# Patient Record
Sex: MALE | Race: WHITE | NOT HISPANIC OR LATINO | Employment: FULL TIME | URBAN - METROPOLITAN AREA
[De-identification: names, ages, dates, MRNs, and addresses within clinical notes are randomized per-mention and may not be internally consistent; named-entity substitution may affect disease eponyms.]

---
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
## 2020-01-16 ENCOUNTER — CLINICAL SUPPORT (OUTPATIENT)
Dept: PEDIATRIC CARDIOLOGY | Facility: CLINIC | Age: 20
End: 2020-01-16
Attending: NURSE PRACTITIONER
Payer: COMMERCIAL

## 2020-01-16 ENCOUNTER — OFFICE VISIT (OUTPATIENT)
Dept: PEDIATRIC CARDIOLOGY | Facility: CLINIC | Age: 20
End: 2020-01-16
Payer: COMMERCIAL

## 2020-01-16 VITALS
SYSTOLIC BLOOD PRESSURE: 125 MMHG | DIASTOLIC BLOOD PRESSURE: 80 MMHG | HEIGHT: 67 IN | BODY MASS INDEX: 21.2 KG/M2 | RESPIRATION RATE: 16 BRPM | OXYGEN SATURATION: 99 % | HEART RATE: 52 BPM | WEIGHT: 135.06 LBS

## 2020-01-16 DIAGNOSIS — Q23.0 AORTIC STENOSIS DUE TO BICUSPID AORTIC VALVE: ICD-10-CM

## 2020-01-16 DIAGNOSIS — R00.2 PALPITATION: ICD-10-CM

## 2020-01-16 DIAGNOSIS — R07.9 CHEST PAIN, UNSPECIFIED TYPE: ICD-10-CM

## 2020-01-16 DIAGNOSIS — Q23.1 AORTIC STENOSIS DUE TO BICUSPID AORTIC VALVE: ICD-10-CM

## 2020-01-16 DIAGNOSIS — Q23.1 TRICUSPID BUT FUNCTIONALLY BICUSPID AORTIC VALVE: ICD-10-CM

## 2020-01-16 DIAGNOSIS — R00.1 BRADYCARDIA: ICD-10-CM

## 2020-01-16 PROCEDURE — 3008F PR BODY MASS INDEX (BMI) DOCUMENTED: ICD-10-PCS | Mod: CPTII,S$GLB,, | Performed by: NURSE PRACTITIONER

## 2020-01-16 PROCEDURE — 93000 ELECTROCARDIOGRAM COMPLETE: CPT | Mod: 59,S$GLB,, | Performed by: PEDIATRICS

## 2020-01-16 PROCEDURE — 3008F BODY MASS INDEX DOCD: CPT | Mod: CPTII,S$GLB,, | Performed by: NURSE PRACTITIONER

## 2020-01-16 PROCEDURE — 93000 PR ELECTROCARDIOGRAM, COMPLETE: ICD-10-PCS | Mod: 59,S$GLB,, | Performed by: PEDIATRICS

## 2020-01-16 PROCEDURE — 99214 PR OFFICE/OUTPT VISIT, EST, LEVL IV, 30-39 MIN: ICD-10-PCS | Mod: 25,S$GLB,, | Performed by: NURSE PRACTITIONER

## 2020-01-16 PROCEDURE — 99214 OFFICE O/P EST MOD 30 MIN: CPT | Mod: 25,S$GLB,, | Performed by: NURSE PRACTITIONER

## 2020-01-16 NOTE — ASSESSMENT & PLAN NOTE
HR today is 52bpm, reportedly has been 44-47bpm at home. 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 He denies dizziness, fatigue, lethargy, and syncope. We will evaluate heart rate ranges on holter.

## 2020-01-16 NOTE — ASSESSMENT & PLAN NOTE
We have discussed his aortic valve anatomy, which is functionally bicuspid. In this situation, we monitor specifically for aortic stenosis (narrowing), aortic insufficiency (leaking), and aortic root ectasia (dilation). Statistically, these changes will occur over time. He should be seen at least yearly and have serial echocardiograms to monitor for changes. For now, he can be handled normally but may require activity restrictions in the future pending the valve changes.     Based on 2018 echo and today's exam, he currently has mild stenosis, trivial insufficiency, and mildly dilated ascending aorta (z-score +2.2).

## 2020-01-16 NOTE — PROGRESS NOTES
[facility]  Tomer Melissa  2000    Tomer Melissa is a 19 y.o. male presenting for follow-up of functional bicuspid aortic valve.  Tomer is here today with his mother.    HPI  Toemr was initially sent for cardiac evaluation in 2013 for blacking out and chest pain; he was subsequently diagnosed with functionally bicuspid aortic valve, minimal stenosis, mild insufficiency, and LV enlargement. He failed follow-up until March 2018 when he returned for chest pain, which we felt was likely non-cardiac. His exam revealed soft MARY noted over LVOT. He was scheduled for CXR and echo, then asked to return in 1 year.     Tomer comes today for late follow-up with report of chest pain x 1, palpitations x 3, and low heart rate over the last month or two. Since our last visit, he has not been very physically active particularly now that he's in college. He denies excessive caffeine, illness, injury, or hospitalization. For the last month, he has had 3 episodes of palpitations while laying in bed at night. He reports that his heart beat is very slow but it beats hard; HR 44-47 using blood pressure cuff. He denies being anxious. On one occasion, he had a soreness in the lower left chest and was seen in the ER at [facility]. The ER note has been reviewed and scanned to the chart. Cardiac enzymes were negative but BNP was elevated (260, range 5-125), Magnesium was low (1.6, range 1.8-2.4), and total bilirubin was elevated (1.3, range 0.2-1.0). He had chest and abdominal x-rays which were normal and EKG which is similar to today's EKG with bradycardia, wandering atrial pacemaker, and early repolarization.       No current outpatient medications on file.  Allergies: Review of patient's allergies indicates:  No Known Allergies    Family History   Problem Relation Age of Onset    No Known Problems Mother     Thyroid disease Father     Other Father         lung collapse in 1999    Fainting Father          "syncope loop recorder placed Dec 17    No Known Problems Sister     No Known Problems Brother     COPD Maternal Grandmother     Cardiomyopathy Maternal Grandmother     Hypertension Maternal Grandfather     Hypertension Paternal Grandfather     Arrhythmia Neg Hx     Congenital heart disease Neg Hx     Pacemaker/defibrilator Neg Hx     Long QT syndrome Neg Hx     Heart attacks under age 50 Neg Hx      Past Medical History:   Diagnosis Date    Aortic insufficiency     Aortic stenosis     Chest pain     Tricuspid but functionally bicuspid aortic valve      Past Surgical History:   Procedure Laterality Date    NO PAST SURGERIES       Birth History    Birth     Weight: 3.402 kg (7 lb 8 oz)    Gestation Age: 40 wks     Social History     Social History Narrative    Currently attending college in [place], no regular exercise. Sleeps 5-8 hours each night; drinks Mountain Dew every other day or so, otherwise mostly drinks water. Appetite is good.        Review of Systems   Constitutional: Negative for activity change, appetite change and fatigue.   Respiratory: Negative for shortness of breath, wheezing and stridor.    Cardiovascular: Positive for chest pain and palpitations.   Gastrointestinal: Negative.    Genitourinary: Negative.    Musculoskeletal: Negative.    Skin: Negative for color change and rash.   Neurological: Negative for dizziness, seizures, syncope, weakness and headaches.   Hematological: Does not bruise/bleed easily.   Psychiatric/Behavioral: The patient is not nervous/anxious.        Objective:   Vitals:    01/16/20 0843   BP: 125/80   BP Location: Right arm   Patient Position: Sitting   BP Method: Large (Manual)   Pulse: (!) 52   Resp: 16   SpO2: 99%   Weight: 61.3 kg (135 lb 0.5 oz)   Height: 5' 6.54" (1.69 m)       Physical Exam   Constitutional: He is oriented to person, place, and time. Vital signs are normal. He appears well-developed and well-nourished. He is active and cooperative. " No distress.   HENT:   Head: Normocephalic.   Neck: Normal range of motion.   Cardiovascular: Normal rate, regular rhythm, S1 normal and S2 normal.  No extrasystoles are present. Exam reveals no S3 and no S4.   Murmur (grade 1/6 MARY noted along LVOT to URSB) heard.  Pulses:       Radial pulses are 2+ on the right side.        Femoral pulses are 2+ on the right side.  There are no clicks, rumbles, rubs, lifts, taps, or thrills noted.   Pulmonary/Chest: Effort normal and breath sounds normal. No respiratory distress. He exhibits no deformity.   Abdominal: Soft. Normal appearance and bowel sounds are normal. He exhibits no distension. There is no hepatosplenomegaly.   There are no abdominal bruits noted.   Musculoskeletal: Normal range of motion.   Neurological: He is alert and oriented to person, place, and time.   Skin: Skin is warm and dry. No rash noted. No cyanosis. Nails show no clubbing.   Psychiatric: He has a normal mood and affect. His speech is normal and behavior is normal.   Nursing note and vitals reviewed.      Tests:   Today's EKG interpretation by Dr. Osorio reveals: normal sinus rhythm and wandering atrial pacemaker with QRS axis +68 degrees in the frontal plane. There is no atrial enlargement or ventricular hypertrophy noted. Early repolarization.  (Final report in electronic medical record)    Echocardiogram:   Pertinent Echocardiographic findings from the Echo dated 5/16/18 are:   Bicommissural Aortic valve with fusion of RCC and NCC  AV PG 19 (10) mmHg  AV Peak velocity 2.15 m/s  Trivial AI  Dilatation of the Asc. Ao, 3.1cm, z-score +2.2  Descending aorta PG 25 (7) mmHg  (Full report in electronic medical record)    Cm(z-score) 3/12/13 [facility] echo 5/16/18 [facility] echo   LVID 4.8 (z+1.1) 4.8 (z-0.23)   Ao annulus  2.3 (z+1.3)   Ao root 2.1 (z-0.99) 2.8 (z+0.14)   ST junction  3.0 (z+2.5)   Ascending Aorta 2.0 (z+1.1) 3.1 (z+2.2)   AV gradient 12(7) 19(10)   AV insufficiency Mild Trivial         Assessment:  1. Tricuspid but functionally bicuspid aortic valve    2. Aortic stenosis due to bicuspid aortic valve    3. Chest pain, unspecified type    4. Palpitation    5. Bradycardia        Discussion:   Dr. Osorio reviewed history and physical exam. He then performed the physical exam. He discussed the findings with the patient's caregiver(s), and answered all questions.    Tricuspid but functionally bicuspid aortic valve  We have discussed his aortic valve anatomy, which is functionally bicuspid. In this situation, we monitor specifically for aortic stenosis (narrowing), aortic insufficiency (leaking), and aortic root ectasia (dilation). Statistically, these changes will occur over time. He should be seen at least yearly and have serial echocardiograms to monitor for changes. For now, he can be handled normally but may require activity restrictions in the future pending the valve changes.     Based on 2018 echo and today's exam, he currently has mild stenosis, trivial insufficiency, and mildly dilated ascending aorta (z-score +2.2).    Aortic valve stenosis  Aortic stenosis, using mean gradient to categorize, is considered mild with gradient < 25mmHg, moderate with gradient 25-40mmHg. Activity restrictions and/or intervention may be needed for moderate to severe stenosis. There is risk of sudden death with severe aortic stenosis. 2018 echo gradient was mean 10mmHg and mild; physical exam today is consistent with those findings but repeat echo will be done today.    Chest pain  Tomer has a clinical exam and history consistent with noncardiac chest pain. Noncardiac chest pain can be costochondritis, pleurisy, chest wall pain, asthma, reflux, etc. We have discussed the difference between the pain reported today and typical pathologic pain that could occur with moderate to severe aortic stenosis. Cardiac enzymes done at recent ER visit for chest pain were normal.     Palpitation  Tomer reports his heart  beating slow and hard on three occasions recently. He has wandering atrial pacemaker on EKG. We will obtain 5 day holter today to evaluate for dysrhythmias and to determine heart rate range.     Bradycardia  HR today is 52bpm, reportedly has been 44-47bpm at home. 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 He denies dizziness, fatigue, lethargy, and syncope. We will evaluate heart rate ranges on holter.     I have reviewed our general guidelines related to cardiac issues with the family.  I instructed them in the event of an emergency to call 911 or go to the nearest emergency room.  They know to contact the PCP if problems arise or if they are in doubt.      Plan:    1. Activity:No activity restrictions are indicated at this time. Activities may include endurance training, interscholastic athletic, competition and contact sports. However, I would recommend avoiding heavy weight lifting; anything that can be moved 10-20 times without straining would be appropriate.    2. Selective endocarditis prophylaxis is recommended in this circumstance.     3. Medications:   No current outpatient medications on file.     No current facility-administered medications for this visit.      4. Orders placed this encounter  Orders Placed This Encounter   Procedures    Holter Monitor - 3-14 Day Pediatrics    EKG 12-lead pediatric    Echocardiogram pediatric     5. Follow up with the primary care provider for the following issues: total bilirubin was elevated and magnesium was low at recent ER visit.       Follow-Up:   Follow up for echo and 5 day holter today; clinic f/u, EKG, and echo in 1 year.      Sincerely,    Johnny Osorio MD    Note Contributing Authors:  MD Maria Luz Olea APRN, PNP-C

## 2020-01-16 NOTE — ASSESSMENT & PLAN NOTE
Tomer has a clinical exam and history consistent with noncardiac chest pain. Noncardiac chest pain can be costochondritis, pleurisy, chest wall pain, asthma, reflux, etc. We have discussed the difference between the pain reported today and typical pathologic pain that could occur with moderate to severe aortic stenosis. Cardiac enzymes done at recent ER visit for chest pain were normal.

## 2020-01-16 NOTE — ASSESSMENT & PLAN NOTE
Aortic stenosis, using mean gradient to categorize, is considered mild with gradient < 25mmHg, moderate with gradient 25-40mmHg. Activity restrictions and/or intervention may be needed for moderate to severe stenosis. There is risk of sudden death with severe aortic stenosis. 2018 echo gradient was mean 10mmHg and mild; physical exam today is consistent with those findings but repeat echo will be done today.

## 2020-01-16 NOTE — PATIENT INSTRUCTIONS
Johnny Osorio MD  Pediatric Cardiology  [address] [place] [address]  [phone]    General Guidelines    Name: Tomer Melissa                   : 2000    Diagnosis:   1. Tricuspid but functionally bicuspid aortic valve    2. Aortic stenosis due to bicuspid aortic valve    3. Chest pain, unspecified type    4. Palpitation    5. Bradycardia        PCP: Antwon Rodrigez MD  PCP Phone Number: [phone]    · If you have an emergency or you think you have an emergency, go to the nearest emergency room!     · Breathing too fast, doesnt look right, consistently not eating well, your child needs to be checked. These are general indications that your child is not feeling well. This may be caused by anything, a stomach virus, an ear ache or heart disease, so please call Antwon Rodrigez MD. If Antwon Rodrigez MD thinks you need to be checked for your heart, they will let us know.     · If your child experiences a rapid or very slow heart rate and has the following symptoms, call Antwon Rodrigez MD or go to the nearest emergency room.   · unexplained chest pain   · does not look right   · feels like they are going to pass out   · actually passes out for unexplained reasons   · weakness or fatigue   · shortness of breath  or breathing fast   · consistent poor feeding     · If your child experiences a rapid or very slow heart rate that lasts longer than 30 minutes call Antwon Rodrigez MD or go to the nearest emergency room.     · If your child feels like they are going to pass out - have them sit down or lay down immediately. Raise the feet above the head (prop the feet on a chair or the wall) until the feeling passes. Slowly allow the child to sit, then stand. If the feeling returns, lay back down and start over.     It is very important that you notify Antwon Rodrigez MD first. Antwon Rodrigez MD or the ER Physician can reach Dr. Johnny Osorio at the office or through [facility]  PICU at [phone] as needed.    Call our office ([phone]) one week after ALL tests for results.         PREVENTION OF BACTERIAL ENDOCARDITIS (selective IE)    A COPY OF THIS SHEET MUST BE GIVEN TO ALL OF YOUR DOCTORS OR HEALTH CARE PROVIDERS    You have received this information because you are at an increased risk for developing adverse outcomes from infective endocarditis (IE), also known as subacute bacterial endocarditis (SBE).    Patient Name:  Tomer Melissa    : 2000   Diagnosis:   1. Tricuspid but functionally bicuspid aortic valve    2. Aortic stenosis due to bicuspid aortic valve    3. Chest pain, unspecified type    4. Palpitation    5. Bradycardia        As of 2020, Johnny Osorio MD, Pediatric Cardiologist recommends that Tomer receive SELECTIVE USE of antibiotic prophylaxis from bacterial endocarditis.    Antibiotic prophylaxis with dental or surgical procedures is recommended in selected instances if your dentist, surgeon or physician believes there is a greater risk of infection.  For example:  1) Any significantly infected operative field (Example: dental abscess or ruptured appendix) which may increase the bacterial load to the blood stream during the procedure; 2) Benefits of antibiotic coverage should be weighed against risk of allergic reactions and anaphylaxis; therefore, their use should be carefully selected based on individual cases.     Antibiotic prophylaxis is NOT recommended for the following dental procedures or events: routine anesthetic injections through non-infected tissue; taking dental radiographs; placement of removable prosthodontic or orthodontic appliances; adjustment of orthodontic appliances; placement of orthodontic brackets; and shedding of deciduous teeth or bleeding from trauma to the lips or oral mucosa.   If recommended by the Health Care Provider - Antibiotic Prophylactic Regimens   Regimen - Single Dose 30-60 minutes before Procedure  Situation  Agent Adults Children   Oral Amoxicillin 2g 50/mg/kg   Unable to take oral meds Ampicillin   OR  Cefazolin or ceftriaxone 2 g IM or IV1    1 g IM or IV 50 mg/kg IM or IV    50 mg/kg IM or IV   Allergic to Penicillins or ampicillin-Oral regimen Cephalexin 2  OR  Clindamycin  OR  Azithromycin or clarithromycin 2 g    600 mg    500 mg 50 mg/kg    20 mg/kg    15 mg/kg   Allergic to penicillin or ampicillin and unable to take oral medications Cefazolin or ceftriaxone 3  OR  Clindamycin 1 g IM or IV    600 mg IM or IV 50 mg/kg IM or IV    20 mg/kg IM or IV   1IM - intramuscular; IV - intravenous  2Or other first or second generation oral cephalosporin in equivalent adult or pediatric dosage.  3Cephalosporins should not be used in an individual with a history of anaphylaxis, angioedema or urticaria with penicillin or ampicillin.   Adapted from Prevention of Infective Endocarditis: Guidelines From the American Heart Association, by the Committee on Rheumatic Fever, Endocarditis, and Kawasaki Disease. Circulation, e-published April 19, 2007. Go to www.americanheart.org/presenter for more information.    The practice of giving patients antibiotics prior to a dental procedure is no longer recommended EXCEPT for patients with the highest risk of adverse outcomes resulting from bacterial endocarditis. We cannot exclude the possibility that an exceedingly small number of cases, if any, of bacterial endocarditis may be prevented by antibiotic prophylaxis prior to a dental procedure. The importance of good oral and dental health and regular visits to the dentist is important for patients at risk for bacterial endocarditis.  Gastrointestinal (GI)/Genitourinary () Procedures: Antibiotic prophylaxis solely to prevent bacterial endocarditis is no longer recommended for patients who undergo a GI or  tract procedures, including patients with the highest risk of adverse outcomes due to bacterial endocarditis.    Good dental health  and hygiene is very effective in preventing bacterial endocarditis.   Always practice good dental health!

## 2020-01-16 NOTE — ASSESSMENT & PLAN NOTE
Tomer reports his heart beating slow and hard on three occasions recently. He has wandering atrial pacemaker on EKG. We will obtain 5 day holter today to evaluate for dysrhythmias and to determine heart rate range.

## 2020-01-16 NOTE — LETTER
January 16, 2020      Antwon Rodrigez MD  [address] [place], [place] [address] [place] [address]           [facility]  [address] [facility] [address]  Phone: [phone]  Fax: [phone]          Patient: Tomer Melissa   MR Number: [id number]   YOB: 2000   Date of Visit: 1/16/2020       Dear Dr. Antwon Rodrigez:    Thank you for referring Tomer Melissa to me for evaluation. Attached you will find relevant portions of my assessment and plan of care.    If you have questions, please do not hesitate to call me. I look forward to following Tomer Melissa along with you.    Sincerely,    CARLIE Stover,PNP-C    Enclosure  CC:  No Recipients    If you would like to receive this communication electronically, please contact [email or web address] or [phone] to request more information on [employer] Link access.    For providers and/or their staff who would like to refer a patient to [facility], please contact us through our one-stop-shop provider referral line, [facility], at [phone].    If you feel you have received this communication in error or would no longer like to receive these types of communications, please e-mail [email or web address]

## 2020-01-17 ENCOUNTER — TELEPHONE (OUTPATIENT)
Dept: PEDIATRIC CARDIOLOGY | Facility: CLINIC | Age: 20
End: 2020-01-17

## 2020-01-21 ENCOUNTER — DOCUMENTATION ONLY (OUTPATIENT)
Dept: PEDIATRIC CARDIOLOGY | Facility: CLINIC | Age: 20
End: 2020-01-21

## 2020-01-21 NOTE — PROGRESS NOTES
Cm(z-score) 3/12/13 [facility] echo 5/16/18 [facility] echo 1/16/2020 [facility] echo   LVID 4.8 (z+1.1) 4.8 (z-0.23) 5.1 (z+0.61)   Ao annulus   2.3 (z+1.3) 2.5 (z+2.6)   Ao root 2.1 (z-0.99) 2.8 (z+0.14) 3.0 (z+0.90)   ST junction   3.0 (z+2.5) 3.0 (z+2.7)   Ascending Aorta 2.0 (z+1.1) 3.1 (z+2.2) 3.5 (z+3.5)   AV gradient 12(7) 19(10) 15 CW   AV insufficiency Mild Trivial  Trivial + x 2 jets     Main change of concern is the ascending aorta, which is up 4mm and z-score now +3.5. Will review with TDK and request verification of measurements.     Seen 1/16/2020; holter placed for bradycardia, asymptomatic. Plan to return in 1 year. Now in college, no sports; advised no heavy weights.     TDK reviewed and agrees with ascending aorta measurement of 3.5cm. F/u 1 year.

## 2020-01-23 ENCOUNTER — TELEPHONE (OUTPATIENT)
Dept: PEDIATRIC CARDIOLOGY | Facility: CLINIC | Age: 20
End: 2020-01-23

## 2020-01-23 NOTE — TELEPHONE ENCOUNTER
Mom phoned. Reviewed echo results:   the heart function and chamber sizes were normal. His aortic measurements have changed slightly and one specifically has grown by 4mm so I'm going to have Dr. Osorio remeasure that one to be sure its accurate. His narrowing is still mild and the leaking is still trivial. We do expect changes over time; there is nothing that requires intervention for now; and it is still very important to protect the valve as long as we can - no heavy weight lifting.   We'll plan repeat echo in 1 year unless Dr. Osorio's review changes that; then we'll call them with any changes.     Mom reports that for the past 2 nights Tomer has woken up and advised that his heart feels weird. Denies pain and can not explain what he feels. Mom advised d/t what he feels he is unable to sleep. Advised mom I would review with MLP.    Phoned Tomer's phone to get more information and to review results. No answer. Unable to leave a message.    ----- Message from CARLIE Stover,PNP-C sent at 1/23/2020  9:26 AM CST -----  Please review with patient that the heart function and chamber sizes were normal. His aortic measurements have changed slightly and one specifically has grown by 4mm so I'm going to have Dr. Osorio remeasure that one to be sure its accurate. His narrowing is still mild and the leaking is still trivial. We do expect changes over time; there is nothing that requires intervention for now; and it is still very important to protect the valve as long as we can - no heavy weight lifting.     We'll plan repeat echo in 1 year unless Dr. Osorio's review changes that; then we'll call them with any changes.     mary    ----- Message -----  From: Demetra Osorio RN  Sent: 1/23/2020   9:10 AM CST  To: CARLIE Stover,PNP-C        ----- Message -----  From: Francheska Hathaway  Sent: 1/23/2020   8:59 AM CST  To: [facility] Staff    Pt is requesting a call back to get test results from Echo test.  Please call and  advise            Thank you

## 2020-01-23 NOTE — TELEPHONE ENCOUNTER
Phoned mom back reviewed Maria Luz's note with mom: Echo findings do not support anything like that, but holter was ordered at last visit and is pending. Did he have those symptoms during holter? We'll reassess after the holter is received and reviewed.   Mom denies symptoms during holter. Holter was taken off on Tuesday. Asked mom if she has mailed holter. Mom advised she would mail in the morning. Instructed mom to take Tomer to PCP/ER with the following per AVS:   unexplained chest pain   does not look right   feels like they are going to pass out   actually passes out for unexplained reasons   weakness or fatigue   shortness of breath or breathing fast   consistent poor feeding   If your child experiences a rapid or very slow heart rate that lasts longer than 30 minutes call Antwon Rodrigez MD or go to the nearest emergency room.    Mom verbalizes understanding.

## 2020-01-29 LAB
OHS CV EVENT MONITOR DAY: 5
OHS CV HOLTER LENGTH DECIMAL HOURS: 120
OHS CV HOLTER LENGTH HOURS: 0
OHS CV HOLTER LENGTH MINUTES: 0

## 2020-03-02 ENCOUNTER — TELEPHONE (OUTPATIENT)
Dept: PEDIATRIC CARDIOLOGY | Facility: CLINIC | Age: 20
End: 2020-03-02

## 2020-03-02 NOTE — TELEPHONE ENCOUNTER
----- Message from Opal Guerra MA sent at 3/2/2020  3:06 PM CST -----  Regarding: erik Tj Mora  Contact: [phone]  Call with holter results

## 2020-03-02 NOTE — TELEPHONE ENCOUNTER
Called mom back with results:     Result Notes for Holter Monitor - 3-14 Day Pediatrics:   Notes recorded by CARLIE Stover PNP-C on 2/25/2020 at 3:21 PM CST  REv'd with TDK. Ok as long as he is not symptomatic.  ------    Notes recorded by CARLIE Stover PNP-C on 1/30/2020 at 1:59 PM CST  Asymptomatic bradycardia noted in clinic. Hx Bicuspid AV. HR in clinic was 52bpm, reportedly has been 44-47bpm at home. 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 He denies dizziness, fatigue, lethargy, and syncope.     Interpretation Summary:   Sinus rhythm  Rare atrial ectopy  Rare PVCs  No diary symptoms     Diary:   The diary was returned incomplete. The tape was adequate (5 days , 0 hours, 0 minutes).     Rhythm:   Predominant Rhythm  Sinus rhythm with heart rates varying between 39 and 129 bpm with an average of 71 bpm.     Maximum heart rate recorded at: 14:12 on day 2.     Minimum heart rate recorded at 02:00 on day 6.     PVC:   Ventricular Arrhythmias  There were very rare PVCs totalling 4 and averaging 0.03 per hour.     PAC:   Supraventricular Arrhythmias  There were occasional PACs totalling 1220 and averaging 10.17 per hour.     115 atrial couplets.     Reminded of f/u in Jan 2021. All questions answered.

## 2021-01-26 DIAGNOSIS — Q23.1 BICUSPID AORTIC VALVE: Primary | ICD-10-CM

## 2021-01-26 DIAGNOSIS — Q23.1 TRICUSPID BUT FUNCTIONALLY BICUSPID AORTIC VALVE: ICD-10-CM

## 2021-01-26 DIAGNOSIS — I35.0 AORTIC VALVE STENOSIS, ETIOLOGY OF CARDIAC VALVE DISEASE UNSPECIFIED: ICD-10-CM

## 2021-01-26 DIAGNOSIS — I35.1 AORTIC VALVE INSUFFICIENCY, ETIOLOGY OF CARDIAC VALVE DISEASE UNSPECIFIED: ICD-10-CM

## 2021-01-29 DIAGNOSIS — R00.2 PALPITATIONS: ICD-10-CM

## 2021-01-29 DIAGNOSIS — R00.1 BRADYCARDIA: Primary | ICD-10-CM

## 2021-03-25 ENCOUNTER — OFFICE VISIT (OUTPATIENT)
Dept: PEDIATRIC CARDIOLOGY | Facility: CLINIC | Age: 21
End: 2021-03-25
Payer: COMMERCIAL

## 2021-03-25 ENCOUNTER — CLINICAL SUPPORT (OUTPATIENT)
Dept: PEDIATRIC CARDIOLOGY | Facility: CLINIC | Age: 21
End: 2021-03-25
Attending: PEDIATRICS
Payer: COMMERCIAL

## 2021-03-25 VITALS
OXYGEN SATURATION: 97 % | SYSTOLIC BLOOD PRESSURE: 120 MMHG | RESPIRATION RATE: 16 BRPM | HEIGHT: 67 IN | DIASTOLIC BLOOD PRESSURE: 80 MMHG | WEIGHT: 145.75 LBS | BODY MASS INDEX: 22.88 KG/M2 | HEART RATE: 58 BPM

## 2021-03-25 DIAGNOSIS — I35.0 AORTIC VALVE STENOSIS, ETIOLOGY OF CARDIAC VALVE DISEASE UNSPECIFIED: ICD-10-CM

## 2021-03-25 DIAGNOSIS — Q23.1 TRICUSPID BUT FUNCTIONALLY BICUSPID AORTIC VALVE: ICD-10-CM

## 2021-03-25 DIAGNOSIS — I35.1 AORTIC VALVE INSUFFICIENCY, ETIOLOGY OF CARDIAC VALVE DISEASE UNSPECIFIED: ICD-10-CM

## 2021-03-25 DIAGNOSIS — R00.1 BRADYCARDIA: ICD-10-CM

## 2021-03-25 DIAGNOSIS — Q23.1 TRICUSPID BUT FUNCTIONALLY BICUSPID AORTIC VALVE: Primary | ICD-10-CM

## 2021-03-25 PROCEDURE — 93000 EKG 12-LEAD: ICD-10-PCS | Mod: S$GLB,,, | Performed by: PEDIATRICS

## 2021-03-25 PROCEDURE — 99214 OFFICE O/P EST MOD 30 MIN: CPT | Mod: 25,S$GLB,, | Performed by: NURSE PRACTITIONER

## 2021-03-25 PROCEDURE — 99214 PR OFFICE/OUTPT VISIT, EST, LEVL IV, 30-39 MIN: ICD-10-PCS | Mod: 25,S$GLB,, | Performed by: NURSE PRACTITIONER

## 2021-03-25 PROCEDURE — 93000 ELECTROCARDIOGRAM COMPLETE: CPT | Mod: S$GLB,,, | Performed by: PEDIATRICS

## 2021-03-25 PROCEDURE — 3008F PR BODY MASS INDEX (BMI) DOCUMENTED: ICD-10-PCS | Mod: CPTII,S$GLB,, | Performed by: NURSE PRACTITIONER

## 2021-03-25 PROCEDURE — 3008F BODY MASS INDEX DOCD: CPT | Mod: CPTII,S$GLB,, | Performed by: NURSE PRACTITIONER

## 2021-03-29 ENCOUNTER — TELEPHONE (OUTPATIENT)
Dept: PEDIATRIC CARDIOLOGY | Facility: CLINIC | Age: 21
End: 2021-03-29

## 2022-01-31 ENCOUNTER — CLINICAL SUPPORT (OUTPATIENT)
Dept: PEDIATRIC CARDIOLOGY | Facility: CLINIC | Age: 22
End: 2022-01-31
Attending: NURSE PRACTITIONER
Payer: COMMERCIAL

## 2022-01-31 VITALS
HEIGHT: 67 IN | WEIGHT: 156.75 LBS | OXYGEN SATURATION: 97 % | RESPIRATION RATE: 18 BRPM | BODY MASS INDEX: 24.6 KG/M2 | SYSTOLIC BLOOD PRESSURE: 122 MMHG | DIASTOLIC BLOOD PRESSURE: 78 MMHG | HEART RATE: 58 BPM

## 2022-01-31 DIAGNOSIS — I35.0 AORTIC VALVE STENOSIS, ETIOLOGY OF CARDIAC VALVE DISEASE UNSPECIFIED: ICD-10-CM

## 2022-01-31 DIAGNOSIS — I35.1 AORTIC VALVE INSUFFICIENCY, ETIOLOGY OF CARDIAC VALVE DISEASE UNSPECIFIED: ICD-10-CM

## 2022-01-31 DIAGNOSIS — R00.1 BRADYCARDIA: ICD-10-CM

## 2022-01-31 DIAGNOSIS — Q23.1 TRICUSPID BUT FUNCTIONALLY BICUSPID AORTIC VALVE: ICD-10-CM

## 2022-01-31 DIAGNOSIS — I77.819 AORTIC DILATATION: ICD-10-CM

## 2022-01-31 PROCEDURE — 99214 OFFICE O/P EST MOD 30 MIN: CPT | Mod: S$GLB,,, | Performed by: NURSE PRACTITIONER

## 2022-01-31 PROCEDURE — 3078F DIAST BP <80 MM HG: CPT | Mod: CPTII,S$GLB,, | Performed by: NURSE PRACTITIONER

## 2022-01-31 PROCEDURE — 1160F RVW MEDS BY RX/DR IN RCRD: CPT | Mod: CPTII,S$GLB,, | Performed by: NURSE PRACTITIONER

## 2022-01-31 PROCEDURE — 3078F PR MOST RECENT DIASTOLIC BLOOD PRESSURE < 80 MM HG: ICD-10-PCS | Mod: CPTII,S$GLB,, | Performed by: NURSE PRACTITIONER

## 2022-01-31 PROCEDURE — 1160F PR REVIEW ALL MEDS BY PRESCRIBER/CLIN PHARMACIST DOCUMENTED: ICD-10-PCS | Mod: CPTII,S$GLB,, | Performed by: NURSE PRACTITIONER

## 2022-01-31 PROCEDURE — 3008F BODY MASS INDEX DOCD: CPT | Mod: CPTII,S$GLB,, | Performed by: NURSE PRACTITIONER

## 2022-01-31 PROCEDURE — 3008F PR BODY MASS INDEX (BMI) DOCUMENTED: ICD-10-PCS | Mod: CPTII,S$GLB,, | Performed by: NURSE PRACTITIONER

## 2022-01-31 PROCEDURE — 1159F PR MEDICATION LIST DOCUMENTED IN MEDICAL RECORD: ICD-10-PCS | Mod: CPTII,S$GLB,, | Performed by: NURSE PRACTITIONER

## 2022-01-31 PROCEDURE — 3074F SYST BP LT 130 MM HG: CPT | Mod: CPTII,S$GLB,, | Performed by: NURSE PRACTITIONER

## 2022-01-31 PROCEDURE — 3074F PR MOST RECENT SYSTOLIC BLOOD PRESSURE < 130 MM HG: ICD-10-PCS | Mod: CPTII,S$GLB,, | Performed by: NURSE PRACTITIONER

## 2022-01-31 PROCEDURE — 99214 PR OFFICE/OUTPT VISIT, EST, LEVL IV, 30-39 MIN: ICD-10-PCS | Mod: S$GLB,,, | Performed by: NURSE PRACTITIONER

## 2022-01-31 PROCEDURE — 1159F MED LIST DOCD IN RCRD: CPT | Mod: CPTII,S$GLB,, | Performed by: NURSE PRACTITIONER

## 2022-01-31 NOTE — PATIENT INSTRUCTIONS
Johnny Osorio MD  Pediatric Cardiology  [address] [place] [address]  [phone]    General Guidelines    Name: Tomer Melissa                   : 2000    Diagnosis:   1. Tricuspid but functionally bicuspid aortic valve    2. Aortic dilatation    3. Aortic valve stenosis, etiology of cardiac valve disease unspecified    4. Aortic valve insufficiency, etiology of cardiac valve disease unspecified    5. Bradycardia        PCP: Antwon Rodrigez MD  PCP Phone Number: [phone]    · If you have an emergency or you think you have an emergency, go to the nearest emergency room!     · Breathing too fast, doesnt look right, consistently not eating well, your child needs to be checked. These are general indications that your child is not feeling well. This may be caused by anything, a stomach virus, an ear ache or heart disease, so please call Antwon Rodrigez MD. If Antwon Rodrigez MD thinks you need to be checked for your heart, they will let us know.     · If your child experiences a rapid or very slow heart rate and has the following symptoms, call Antwon Rodrigez MD or go to the nearest emergency room.   · unexplained chest pain   · does not look right   · feels like they are going to pass out   · actually passes out for unexplained reasons   · weakness or fatigue   · shortness of breath  or breathing fast   · consistent poor feeding     · If your child experiences a rapid or very slow heart rate that lasts longer than 30 minutes call Antwon Rodrigez MD or go to the nearest emergency room.     · If your child feels like they are going to pass out - have them sit down or lay down immediately. Raise the feet above the head (prop the feet on a chair or the wall) until the feeling passes. Slowly allow the child to sit, then stand. If the feeling returns, lay back down and start over.     It is very important that you notify Antwon Rodrigez MD first. Antwon Rodrigez MD or the ER Physician can  reach Dr. Johnny Osorio at the office or through [facility] at [phone] as needed.    Call our office ([phone]) one week after ALL tests for results.     PREVENTION OF BACTERIAL ENDOCARDITIS (selective IE)    A COPY OF THIS SHEET MUST BE GIVEN TO ALL OF YOUR DOCTORS OR HEALTH CARE PROVIDERS    You have received this information because you are at an increased risk for developing adverse outcomes from infective endocarditis (IE), also known as subacute bacterial endocarditis (SBE).    Patient Name:  Tomer Melissa    : 2000   Diagnosis:   1. Tricuspid but functionally bicuspid aortic valve    2. Aortic dilatation    3. Aortic valve stenosis, etiology of cardiac valve disease unspecified    4. Aortic valve insufficiency, etiology of cardiac valve disease unspecified    5. Bradycardia        As of 2022, Johnny Osorio MD, Pediatric Cardiologist recommends that Tomer receive SELECTIVE USE of antibiotic prophylaxis from bacterial endocarditis.    Antibiotic prophylaxis with dental or surgical procedures is recommended in selected instances if your dentist, surgeon or physician believes there is a greater risk of infection.  For example:  1) Any significantly infected operative field (Example: dental abscess or ruptured appendix) which may increase the bacterial load to the blood stream during the procedure; 2) Benefits of antibiotic coverage should be weighed against risk of allergic reactions and anaphylaxis; therefore, their use should be carefully selected based on individual cases.     Antibiotic prophylaxis is NOT recommended for the following dental procedures or events: routine anesthetic injections through non-infected tissue; taking dental radiographs; placement of removable prosthodontic or orthodontic appliances; adjustment of orthodontic appliances; placement of orthodontic brackets; and shedding of deciduous teeth or bleeding from trauma to the lips or oral mucosa.    If recommended by the Health Care Provider - Antibiotic Prophylactic Regimens   Regimen - Single Dose 30-60 minutes before Procedure  Situation Agent Adults Children   Oral Amoxicillin 2g 50/mg/kg   Unable to take oral meds Ampicillin   OR  Cefazolin or ceftriaxone 2 g IM or IV1    1 g IM or IV 50 mg/kg IM or IV    50 mg/kg IM or IV   Allergic to Penicillins or ampicillin-Oral regimen Cephalexin 2  OR  Clindamycin  OR  Azithromycin or clarithromycin 2 g    600 mg    500 mg 50 mg/kg    20 mg/kg    15 mg/kg   Allergic to penicillin or ampicillin and unable to take oral medications Cefazolin or ceftriaxone 3  OR  Clindamycin 1 g IM or IV    600 mg IM or IV 50 mg/kg IM or IV    20 mg/kg IM or IV   1IM - intramuscular; IV - intravenous  2Or other first or second generation oral cephalosporin in equivalent adult or pediatric dosage.  3Cephalosporins should not be used in an individual with a history of anaphylaxis, angioedema or urticaria with penicillin or ampicillin.   Adapted from Prevention of Infective Endocarditis: Guidelines From the American Heart Association, by the Committee on Rheumatic Fever, Endocarditis, and Kawasaki Disease. Circulation, e-published April 19, 2007. Go to www.americanheart.org/presenter for more information.    The practice of giving patients antibiotics prior to a dental procedure is no longer recommended EXCEPT for patients with the highest risk of adverse outcomes resulting from bacterial endocarditis. We cannot exclude the possibility that an exceedingly small number of cases, if any, of bacterial endocarditis may be prevented by antibiotic prophylaxis prior to a dental procedure. The importance of good oral and dental health and regular visits to the dentist is important for patients at risk for bacterial endocarditis.  Gastrointestinal (GI)/Genitourinary () Procedures: Antibiotic prophylaxis solely to prevent bacterial endocarditis is no longer recommended for patients who undergo  a GI or  tract procedures, including patients with the highest risk of adverse outcomes due to bacterial endocarditis.    Good dental health and hygiene is very effective in preventing bacterial endocarditis.   Always practice good dental health!

## 2022-01-31 NOTE — PROGRESS NOTES
[facility]  Tomer Melissa  2000    Tomer Melissa is a 21 y.o. male presenting for follow-up of functionally bicuspid AV, aortic dilatation, and bradycardia.  Tomer is here today with his significant other.    HPI  Tomer was initially sent for cardiac evaluation in 2013 for blacking out and chest pain; he was subsequently diagnosed with functionally bicuspid aortic valve, minimal stenosis, mild insufficiency, and LV enlargement. He failed to follow-up until March 2018 when he returned for chest pain, which we felt was likely non-cardiac.    He was last seen in March of 2021 and at that time was doing well with no complaints. His exam that day revealed a grade 1/6 MARY noted along the LVOT to the primary aortic area. EKG was normal. He had an echo and was asked to follow up in one year.     Tomer has been doing well since last visit. Tomer has a lot of energy and does not get short of breath with activity. He had Covid in Sept of 2021. He had fever for about 3 days and received an antibody infusion. Denies any recent illness, surgeries, or hospitalizations.    There are no reports of chest pain, chest pain with exertion, cyanosis, exercise intolerance, dyspnea, fatigue, palpitations, syncope and tachypnea. No other cardiovascular or medical concerns are reported.     Current Medications:   No current outpatient medications on file prior to visit.     No current facility-administered medications on file prior to visit.     Allergies: Review of patient's allergies indicates:  No Known Allergies      Family History   Problem Relation Age of Onset    No Known Problems Mother     Thyroid disease Father     Other Father         lung collapse in 1999    Fainting Father         syncope loop recorder placed Dec 17    No Known Problems Sister     No Known Problems Brother     COPD Maternal Grandmother     Cardiomyopathy Maternal Grandmother     Hypertension Maternal Grandfather     Hypertension  "Paternal Grandfather     Arrhythmia Neg Hx     Congenital heart disease Neg Hx     Pacemaker/defibrilator Neg Hx     Long QT syndrome Neg Hx     Heart attacks under age 50 Neg Hx      Past Medical History:   Diagnosis Date    Aortic insufficiency     Aortic stenosis     Bradycardia     Tricuspid but functionally bicuspid aortic valve      Social History     Socioeconomic History    Marital status: Single   Social History Narrative    Working as an  of packing equipment.      Past Surgical History:   Procedure Laterality Date    NO PAST SURGERIES         Review of Systems    GENERAL: No fever, chills, fatigability, malaise  or weight loss.  CHEST: Denies dyspnea on exertion, cyanosis, wheezing, cough, sputum production   CARDIOVASCULAR: Denies chest pain, palpitations, diaphoresis,  or reduced exercise tolerance.  ABDOMEN: Appetite normal. Denies diarrhea, abdominal pain, nausea or vomiting.  PERIPHERAL VASCULAR: No edema or cyanosis.  NEUROLOGIC: no dizziness, no syncope , no headache   MUSCULOSKELETAL: Denies muscle weakness, joint pain  PSYCHOLOGICAL/BEHAVIORAL: Denies anxiety, severe stress, confusion  SKIN: no rashes, lesions  HEMATOLOGIC: Denies any abnormal bruising or bleeding  ALLERGY/IMMUNOLOGIC: Denies any environmental allergies.     Objective:   /78 (BP Location: Right arm, Patient Position: Sitting, BP Method: Medium (Manual))   Pulse (!) 58   Resp 18   Ht 5' 6.54" (1.69 m)   Wt 71.1 kg (156 lb 12 oz)   SpO2 97%   BMI 24.89 kg/m²     Physical Exam  GENERAL: Awake, well-developed well-nourished, no apparent distress  HEENT: mucous membranes moist and pink, normocephalic, no cranial or carotid bruits, sclera anicteric  CHEST: Good air movement, clear to auscultation bilaterally  CARDIOVASCULAR: Quiet precordium, regular rhythm, single S1, split S2, normal P2, No S3 or S4, no rubs or gallops. No clicks or rumbles. No cardiomegaly by palpation. 1/6 systolic murmur noted along " the LVOT to URSB.   ABDOMEN: Soft, nontender nondistended, no hepatosplenomegaly, no aortic bruits  EXTREMITIES: Warm well perfused, 2+ brachial/femoral pulses, capillary refill <3 seconds, no clubbing, cyanosis, or edema  NEURO: Alert and oriented, cooperative with exam, face symmetric, moves all extremities well.    Tests:   Today's EKG interpretation by Dr. Osorio reveals:   Sinus Bradycardia and There is an rSr' pattern in V1   No LVH  (Final report in electronic medical record)    Preliminary report on today's echo:  Not significantly changed    Echocardiogram:   Pertinent findings from the Echo dated 3/25/21 are:   There are 4 chambers with normally aligned great vessels.  Chamber sizes are qualitatively normal.  There is good LV function.  There are no shunts noted.  Physiological TR, PI, MR.  The right coronary artery and left coronary are patent by 2D.  There is no LVH noted.  AV annulus diameter 2.4cm (Z Score +1.9)  AV annulus area 4.0cm^2 (Z Score +2.1)  AsAo 3.7 cm (Z Score +4.1)  AV sinus Z +0.64  Bicommissural AV with fusion of the NCA and RCA cusps  Doming AV leaflets.  AV PG 13(7), 12(5), 14(6) mmHg (CW)  One trivial+ AI jet noted  LA qualittively normal  Trivial TR  LV Tissue Doppler Data WNL  TAPSE 2.1 cm  Clinical Correlation Suggested  Follow Up Warranted  Selective IE Recommended  (Full report in electronic medical record)    Cm(z-score) 3/12/13 [facility] echo 5/16/18 [facility] echo 1/16/2020 [facility] echo 3/25/21 [facility] echo   LVID 4.8 (z+1.1) 4.8 (z-0.23) 5.1 (z+0.61) 4.7 (z -.84)   Ao annulus   2.3 (z+1.3) 2.5 (z+2.6) 2.4 (z+1.9)   Ao root 2.1 (z-0.99) 2.8 (z+0.14) 3.0 (z+0.90) 3.0 (z+0.64   ST junction   3.0 (z+2.5) 3.0 (z+2.7)     Ascending Aorta 2.0 (z+1.1) 3.1 (z+2.2) 3.5 (z+3.5) 3.7 (z+4.1)   AV gradient 12(7) 19(10) 15 CW 14(6)   AV insufficiency Mild Trivial  Trivial + x 2 jets 1 trivial jet     Holter dated 1/16/20:  Sinus rhythm  Rare atrial ectopy  Rare PVCs  No diary symptoms  Predominant  Rhythm  Sinus rhythm with heart rates varying between 39 and 129 bpm with an average of 71 bpm.       Assessment:  1. Tricuspid but functionally bicuspid aortic valve    2. Aortic dilatation    3. Aortic valve stenosis, etiology of cardiac valve disease unspecified    4. Aortic valve insufficiency, etiology of cardiac valve disease unspecified    5. Bradycardia         Discussion/Plan:   Tomer Melissa is a 21 y.o. male functionally bicuspid aortic valve, aortic dilatation, trivial AI, very mild AS, and bradycardia.  Is doing well, without complaint.  It EKG today is normal mild bradycardia and unchanged per previous visit.  He is stable by exam.  He had an echo today which is not significantly changed by the preliminary report.  We discuss aortic valve anatomy and the aortic dilatation at length.  He asked appropriate questions and all were answered to his satisfaction.    We have discussed Tomer Melissa 's aortic valve anatomy, which is functionally bicuspid. In this situation, we monitor specifically for aortic stenosis (narrowing), aortic insufficiency (leaking), and aortic root ectasia (dilation). Statistically, these changes will occur over time, particularly during puberty. Tomer Melissa should be seen at least yearly and have serial echocardiograms to monitor for changes. For now, Tomer Melissa can be handled normally but may require activity restrictions in the future pending the valve changes.     I have reviewed our general guidelines related to cardiac issues with the family.  I instructed them in the event of an emergency to call 911 or go to the nearest emergency room.  They know to contact the PCP if problems arise or if they are in doubt. The patient should see a dentist every 6 months for routine dental care.    Follow up with the primary care provider for the following issues: Nothing identified.    Activity: We have discussed weight lifting in detail. Heavy weight lifting is associated with  multiple negative health effects, including musculoskeletal injuries, hypertension, and cardiovascular changes including cardiomegaly, hypertrophy, and valve insufficiency. Recent data has revealed that the most significant cardiac affect is on the aortic root.     No activity restrictions are indicated at this time. Activities may include endurance training, interscholastic athletic competition and contact sports. However, I would recommend avoiding heavy weight lifting; anything that can be moved 10-20 times without straining would be appropriate.    Selective endocarditis prophylaxis is recommended in this circumstance.     I spent over 30 minutes with the patient. Over 50% of the time was spent counseling the patient and family member.    Patient or family member was asked to call the office within 3 days of any testing for results.     Dr. Osorio reviewed history and physical exam. He then performed the physical exam. He discussed the findings with the patient's caregiver(s), and answered all questions. I have reviewed our general guidelines related to cardiac issues with the family. I instructed them in the event of an emergency to call 911 or go to the nearest emergency room. They know to contact the PCP if problems arise or if they are in doubt.    Medications:   No current outpatient medications on file.     No current facility-administered medications for this visit.        Orders:   No orders of the defined types were placed in this encounter.    Follow-Up:     Return to clinic in one year with EKG and echo or sooner if there are any concerns.       Sincerely,  Johnny Osorio MD    Note Contributing Authors:  MD Jacek Olea, KIMP-C  This documentation was created using [employer] voice recognition software. Content is subject to voice recognition errors.    01/31/2022    Attestation: Johnny Osorio MD    I have reviewed the records and agree with the above.

## 2022-02-01 ENCOUNTER — DOCUMENTATION ONLY (OUTPATIENT)
Dept: PEDIATRIC CARDIOLOGY | Facility: CLINIC | Age: 22
End: 2022-02-01
Payer: COMMERCIAL

## 2022-02-01 NOTE — PROGRESS NOTES
No significant change since last echo. Notified patient.     Cm(z-score) 3/12/13 [facility] echo 5/16/18 [facility] echo 1/16/2020 [facility] echo 3/25/21 [facility] echo 1/31/22 [facility] echo   LVID 4.8 (z+1.1) 4.8  (z-0.23) 5.1 (z+0.61) 4.7      (z -.84) 4.8  (z+0.63)   Ao annulus   2.3 (z+1.3) 2.5 (z+2.6) 2.4 (z+1.9) 2.4  (z+1.2)   Ao root 2.1 (z-0.99) 2.8 (z+0.14) 3.0 (z+0.90) 3.0 (z+0.64) 2.7  (z-.48)   ST junction   3.0 (z+2.5) 3.0 (z+2.7)   2.6  (Z +.48)   Ascending Aorta 2.0 (z+1.1) 3.1 (z+2.2) 3.5 (z+3.5) 3.7 (z+4.1) 3.7  (z+3.6)   AV gradient 12(7) 19(10) 15 CW 14(6) 17(8)   AV insufficiency Mild Trivial  Trivial + x 2 jets 1 trivial jet 1 trivial + jet

## 2023-02-07 ENCOUNTER — RESEARCH ENCOUNTER (OUTPATIENT)
Dept: RESEARCH | Facility: HOSPITAL | Age: 23
End: 2023-02-07
Payer: COMMERCIAL

## 2023-02-07 NOTE — PROGRESS NOTES
Date:2/7/2023   Trial: VIRIDIANA, 2021.237  PI: Dr. Hussein  Contacted By: Shannan Palomo    I attempted to contact the patient the first time via Phone Call to let them know about the research opportunity KOKOTjRASHAD Study. (Congenital Heart Initiative - Redefining Outcomes and Navigation to Adult Centered Care).  This study is looking to improve care for future adult congenital heart defect patients. The trial consists of surveys periodically that the patient completes independently.The mother answered and said that he was at work and gave me his phone number.    Please contact HERRERA eRno or Dr. Gus Hussein for questions.   Trial Number: [phone]  Trial Email: [email or web address]

## 2023-02-14 ENCOUNTER — RESEARCH ENCOUNTER (OUTPATIENT)
Dept: RESEARCH | Facility: HOSPITAL | Age: 23
End: 2023-02-14
Payer: COMMERCIAL

## 2023-02-14 NOTE — PROGRESS NOTES
Date:2/14/2023   Trial: VIRIDIANA, 2021.237  PI: Dr. Hussein  Contacted By: Shannan Palomo    I attempted to contact the patient the second time via Phone Call to let them know about the research opportunity KOKOTjRASHAD Study. (Congenital Heart Initiative - Redefining Outcomes and Navigation to Adult Centered Care).  This study is looking to improve care for future adult congenital heart defect patients. The trial consists of surveys periodically that the patient completes independently. The patient answered and said he is not interested in joining the study.    Patient is not interested in participating in this study at this time.     Please contact HERRERA [place] or Dr. Gus Hussein for questions.   Trial Number: [id number]  Trial Email: [email or web address]

## 2023-03-20 DIAGNOSIS — R00.1 BRADYCARDIA: ICD-10-CM

## 2023-03-20 DIAGNOSIS — I77.819 AORTIC DILATATION: ICD-10-CM

## 2023-03-20 DIAGNOSIS — Q23.1 TRICUSPID BUT FUNCTIONALLY BICUSPID AORTIC VALVE: Primary | ICD-10-CM

## 2023-03-20 DIAGNOSIS — I35.0 AORTIC VALVE STENOSIS, ETIOLOGY OF CARDIAC VALVE DISEASE UNSPECIFIED: ICD-10-CM

## 2023-03-20 DIAGNOSIS — I35.1 AORTIC VALVE INSUFFICIENCY, ETIOLOGY OF CARDIAC VALVE DISEASE UNSPECIFIED: ICD-10-CM

## 2023-03-30 DIAGNOSIS — I77.819 AORTIC DILATATION: ICD-10-CM

## 2023-03-30 DIAGNOSIS — R00.1 BRADYCARDIA: ICD-10-CM

## 2023-03-30 DIAGNOSIS — Q23.1 TRICUSPID BUT FUNCTIONALLY BICUSPID AORTIC VALVE: Primary | ICD-10-CM

## 2023-03-30 DIAGNOSIS — I35.1 AORTIC VALVE INSUFFICIENCY, ETIOLOGY OF CARDIAC VALVE DISEASE UNSPECIFIED: ICD-10-CM

## 2023-03-30 DIAGNOSIS — I35.0 AORTIC VALVE STENOSIS, ETIOLOGY OF CARDIAC VALVE DISEASE UNSPECIFIED: ICD-10-CM

## 2023-04-03 ENCOUNTER — OFFICE VISIT (OUTPATIENT)
Dept: PEDIATRIC CARDIOLOGY | Facility: CLINIC | Age: 23
End: 2023-04-03
Payer: COMMERCIAL

## 2023-04-03 ENCOUNTER — CLINICAL SUPPORT (OUTPATIENT)
Dept: PEDIATRIC CARDIOLOGY | Facility: CLINIC | Age: 23
End: 2023-04-03
Payer: COMMERCIAL

## 2023-04-03 VITALS
WEIGHT: 167 LBS | OXYGEN SATURATION: 98 % | HEART RATE: 62 BPM | RESPIRATION RATE: 16 BRPM | DIASTOLIC BLOOD PRESSURE: 68 MMHG | SYSTOLIC BLOOD PRESSURE: 124 MMHG | BODY MASS INDEX: 25.31 KG/M2 | HEIGHT: 68 IN

## 2023-04-03 DIAGNOSIS — R00.1 BRADYCARDIA: ICD-10-CM

## 2023-04-03 DIAGNOSIS — Q23.1 TRICUSPID BUT FUNCTIONALLY BICUSPID AORTIC VALVE: ICD-10-CM

## 2023-04-03 DIAGNOSIS — I35.1 AORTIC VALVE INSUFFICIENCY, ETIOLOGY OF CARDIAC VALVE DISEASE UNSPECIFIED: ICD-10-CM

## 2023-04-03 DIAGNOSIS — I35.0 AORTIC VALVE STENOSIS, ETIOLOGY OF CARDIAC VALVE DISEASE UNSPECIFIED: ICD-10-CM

## 2023-04-03 DIAGNOSIS — I77.819 AORTIC DILATATION: ICD-10-CM

## 2023-04-03 PROCEDURE — 93000 EKG 12-LEAD: ICD-10-PCS | Mod: S$GLB,,, | Performed by: PEDIATRICS

## 2023-04-03 PROCEDURE — 3074F PR MOST RECENT SYSTOLIC BLOOD PRESSURE < 130 MM HG: ICD-10-PCS | Mod: CPTII,S$GLB,, | Performed by: NURSE PRACTITIONER

## 2023-04-03 PROCEDURE — 3074F SYST BP LT 130 MM HG: CPT | Mod: CPTII,S$GLB,, | Performed by: NURSE PRACTITIONER

## 2023-04-03 PROCEDURE — 3078F DIAST BP <80 MM HG: CPT | Mod: CPTII,S$GLB,, | Performed by: NURSE PRACTITIONER

## 2023-04-03 PROCEDURE — 99214 PR OFFICE/OUTPT VISIT, EST, LEVL IV, 30-39 MIN: ICD-10-PCS | Mod: 25,S$GLB,, | Performed by: NURSE PRACTITIONER

## 2023-04-03 PROCEDURE — 99214 OFFICE O/P EST MOD 30 MIN: CPT | Mod: 25,S$GLB,, | Performed by: NURSE PRACTITIONER

## 2023-04-03 PROCEDURE — 3008F BODY MASS INDEX DOCD: CPT | Mod: CPTII,S$GLB,, | Performed by: NURSE PRACTITIONER

## 2023-04-03 PROCEDURE — 3008F PR BODY MASS INDEX (BMI) DOCUMENTED: ICD-10-PCS | Mod: CPTII,S$GLB,, | Performed by: NURSE PRACTITIONER

## 2023-04-03 PROCEDURE — 1160F RVW MEDS BY RX/DR IN RCRD: CPT | Mod: CPTII,S$GLB,, | Performed by: NURSE PRACTITIONER

## 2023-04-03 PROCEDURE — 3078F PR MOST RECENT DIASTOLIC BLOOD PRESSURE < 80 MM HG: ICD-10-PCS | Mod: CPTII,S$GLB,, | Performed by: NURSE PRACTITIONER

## 2023-04-03 PROCEDURE — 1159F PR MEDICATION LIST DOCUMENTED IN MEDICAL RECORD: ICD-10-PCS | Mod: CPTII,S$GLB,, | Performed by: NURSE PRACTITIONER

## 2023-04-03 PROCEDURE — 1159F MED LIST DOCD IN RCRD: CPT | Mod: CPTII,S$GLB,, | Performed by: NURSE PRACTITIONER

## 2023-04-03 PROCEDURE — 93000 ELECTROCARDIOGRAM COMPLETE: CPT | Mod: S$GLB,,, | Performed by: PEDIATRICS

## 2023-04-03 PROCEDURE — 1160F PR REVIEW ALL MEDS BY PRESCRIBER/CLIN PHARMACIST DOCUMENTED: ICD-10-PCS | Mod: CPTII,S$GLB,, | Performed by: NURSE PRACTITIONER

## 2023-04-03 NOTE — PROGRESS NOTES
[facility]  Tomer Melissa  2000    Tomer Melissa is a 22 y.o. male presenting for follow-up of functionally bicuspid AV, AS, AI, aortic dilatation, and bradycardia.  Tomer is here today with his mother.    HPI  Tomer Melissa was initially sent for cardiac evaluation in 2013 for blacking out and chest pain; he was subsequently diagnosed with functionally bicuspid aortic valve, minimal stenosis, mild insufficiency, and LV enlargement. He failed to follow-up until March 2018 when he returned for chest pain, which we felt was likely non-cardiac.    He was last seen in Jan of 2022 and at that time was doing well with no complaints. His exam that day revealed a grade 1/6 systolic murmur noted along the LVOT to URSB.  Echo was not significantly changed.  He was asked to follow up in 1 year with echo.    Tomer has been doing well since last visit. Tomer has a lot of energy and does not get short of breath with activity. Denies any recent illness, surgeries, or hospitalizations.    There are no reports of chest pain, chest pain with exertion, cyanosis, exercise intolerance, dyspnea, fatigue, palpitations, syncope, and tachypnea. No other cardiovascular or medical concerns are reported.     Current Medications:   Current Outpatient Medications on File Prior to Visit   Medication Sig Dispense Refill    erythromycin (ROMYCIN) ophthalmic ointment Place a 1/2 inch ribbon of ointment into the left lower eyelid every six hours for five days (Patient not taking: Reported on 4/3/2023) 3.5 g 1     No current facility-administered medications on file prior to visit.     Allergies: Review of patient's allergies indicates:  No Known Allergies      Family History   Problem Relation Age of Onset    No Known Problems Mother     Thyroid disease Father     Other Father         lung collapse in 1999    Fainting Father         syncope loop recorder placed Dec 17    No Known Problems Sister     No Known Problems Brother  "    COPD Maternal Grandmother     Cardiomyopathy Maternal Grandmother     Hypertension Maternal Grandfather     Hypertension Paternal Grandfather     Arrhythmia Neg Hx     Congenital heart disease Neg Hx     Pacemaker/defibrilator Neg Hx     Long QT syndrome Neg Hx     Heart attacks under age 50 Neg Hx      Past Medical History:   Diagnosis Date    Aortic dilatation     Aortic insufficiency     Aortic stenosis     Bradycardia     Tricuspid but functionally bicuspid aortic valve      Social History     Socioeconomic History    Marital status: Single   Social History Narrative    Working as an  of packing equipment.      Past Surgical History:   Procedure Laterality Date    NO PAST SURGERIES         Review of Systems    GENERAL: No fever, chills, fatigability, malaise  or weight loss.  CHEST: Denies dyspnea on exertion, cyanosis, wheezing, cough, sputum production   CARDIOVASCULAR: Denies chest pain, palpitations, diaphoresis,  or reduced exercise tolerance.  ABDOMEN: Appetite normal. Denies diarrhea, abdominal pain, nausea or vomiting.  PERIPHERAL VASCULAR: No edema or cyanosis.  NEUROLOGIC: no dizziness, no syncope , no headache   MUSCULOSKELETAL: Denies muscle weakness, joint pain  PSYCHOLOGICAL/BEHAVIORAL: Denies anxiety, severe stress, confusion  SKIN: no rashes, lesions  HEMATOLOGIC: Denies any abnormal bruising or bleeding  ALLERGY/IMMUNOLOGIC: Denies any environmental allergies.     Objective:   /68 (BP Location: Right arm, Patient Position: Sitting, BP Method: X-Large (Manual))   Pulse 62   Resp 16   Ht 5' 8.11" (1.73 m)   Wt 75.8 kg (167 lb)   SpO2 98%   BMI 25.31 kg/m²     Growth percentile SmartLinks can only be used for patients less than 20 years old.     Physical Exam  GENERAL: Awake, well-developed well-nourished, no apparent distress  HEENT: mucous membranes moist and pink, normocephalic, no cranial or carotid bruits, sclera anicteric  CHEST: Good air movement, clear to " auscultation bilaterally  CARDIOVASCULAR: Quiet precordium, regular rhythm, single S1, split S2, muffled P2, No S3 or S4, no rub. No clicks or rumbles. No cardiomegaly by palpation. 1/6 Systolic murmur noted at the URSB, trivial MR at the apex.   ABDOMEN: Soft, nontender nondistended, no hepatosplenomegaly, no aortic bruits  EXTREMITIES: Warm well perfused, 2+ brachial/femoral pulses, capillary refill <3 seconds, no clubbing, cyanosis, or edema  NEURO: Alert and oriented, cooperative with exam, face symmetric, moves all extremities well.    Tests:   Today's EKG interpretation by Dr. Osorio reveals:   Normal for age and Sinus Rhythm  (Final report in electronic medical record)    Preliminary report on today's echo: Not significantly changed.    Cm(z-score) 3/12/13 [facility] echo 5/16/18 [facility] 1/16/2020 [facility] 3/25/21 [facility] echo 1/31/22 [facility] echo   LVID 4.8 (z+1.1) 4.8  (z-0.23) 5.1 (z+0.61) 4.7      (z -.84) 4.8  (z+0.63)   Ao annulus   2.3 (z+1.3) 2.5 (z+2.6) 2.4 (z+1.9) 2.4  (z+1.2)   Ao root 2.1 (z-0.99) 2.8 (z+0.14) 3.0 (z+0.90) 3.0 (z+0.64) 2.7  (z-.48)   ST junction   3.0 (z+2.5) 3.0 (z+2.7)   2.6  (Z +.48)   Ascending Aorta 2.0 (z+1.1) 3.1 (z+2.2) 3.5 (z+3.5) 3.7 (z+4.1) 3.7  (z+3.6)   AV gradient 12(7) 19(10) 15 CW 14(6) 17(8)   AV insufficiency Mild Trivial  Trivial + x 2 jets 1 trivial jet        Echocardiogram:   Pertinent findings from the Echo dated 1/31/22 are:   AsAo 3.7 cm (Z score 3.6)  Bicommissural AV with fusion of the NCA and RCA cusps  Doming AV leaflets  AV PG 17 (8)  One trivial + AI Jet noted.   (Full report in electronic medical record)      Assessment:  1. Tricuspid but functionally bicuspid aortic valve    2. Aortic dilatation    3. Aortic valve stenosis, etiology of cardiac valve disease unspecified    4. Aortic valve insufficiency, etiology of cardiac valve disease unspecified    5. Bradycardia        Discussion/Plan:   Tomer Melissa is a 22 y.o. male with a  functionally bicuspid aortic  valve, aortic dilatation, trivial AI, very mild AS, and bradycardia. Preliminary report on today's echo is no significant change. EKG and exam today are normal.  We discussed aortic valve anatomy and the aortic dilatation at length.  He asked appropriate questions and all were answered to his satisfaction.     We have discussed Tomer Melissa 's aortic valve anatomy, which is functionally bicuspid. In this situation, we monitor specifically for aortic stenosis (narrowing), aortic insufficiency (leaking), and aortic root ectasia (dilation). Statistically, these changes will occur over time, particularly during puberty. Tomer Melissa should be seen at least yearly and have serial echocardiograms to monitor for changes. For now, Tomer Melissa can be handled normally but may require activity restrictions in the future pending the valve changes.     I have reviewed our general guidelines related to cardiac issues with the family.  I instructed them in the event of an emergency to call 911 or go to the nearest emergency room.  They know to contact the PCP if problems arise or if they are in doubt. The patient should see a dentist every 6 months for routine dental care.    Follow up with the primary care provider for the following issues: Nothing identified.    Activity: We have discussed weight lifting in detail. Heavy weight lifting is associated with multiple negative health effects, including musculoskeletal injuries, hypertension, and cardiovascular changes including cardiomegaly, hypertrophy, and valve insufficiency. Recent data has revealed that the most significant cardiac affect is on the aortic root.     No activity restrictions are indicated at this time. Activities may include endurance training, interscholastic athletic competition and contact sports. However, I would recommend avoiding heavy weight lifting; anything that can be moved 10-20 times without straining would be appropriate.    Selective  endocarditis prophylaxis is recommended in this circumstance.     I spent over 30 minutes with the patient. Over 50% of the time was spent counseling the patient and family member.    Patient or family member was asked to call the office within 3 days of any testing for results.     Dr. Osorio reviewed history and physical exam. He then performed the physical exam. He discussed the findings with the patient's caregiver(s), and answered all questions. I have reviewed our general guidelines related to cardiac issues with the family. I instructed them in the event of an emergency to call 911 or go to the nearest emergency room. They know to contact the PCP if problems arise or if they are in doubt.    Medications:   Current Outpatient Medications   Medication Sig    erythromycin (ROMYCIN) ophthalmic ointment Place a 1/2 inch ribbon of ointment into the left lower eyelid every six hours for five days (Patient not taking: Reported on 4/3/2023)     No current facility-administered medications for this visit.      Orders:   No orders of the defined types were placed in this encounter.    Follow-Up:     Return to clinic in one year with EKG and echo or sooner if there are any concerns.       Sincerely,  Johnny Osorio MD    Note Contributing Authors:  MD Jacek Olea, KIMP-C  This documentation was created using [employer] voice recognition software. Content is subject to voice recognition errors.    04/03/2023    Attestation: Johnny Osorio MD    I have reviewed the records and agree with the above.

## 2023-04-03 NOTE — PATIENT INSTRUCTIONS
Johnny Osorio MD  Pediatric Cardiology  [address] [place] [address]  [phone]    General Guidelines    Name: Tomer Melissa                   : 2000    Diagnosis:   1. Tricuspid but functionally bicuspid aortic valve    2. Aortic dilatation    3. Aortic valve stenosis, etiology of cardiac valve disease unspecified    4. Aortic valve insufficiency, etiology of cardiac valve disease unspecified    5. Bradycardia        PCP: Antwon Rodrigez MD  PCP Phone Number: [phone]    If you have an emergency or you think you have an emergency, go to the nearest emergency room!     Breathing too fast, doesnt look right, consistently not eating well, your child needs to be checked. These are general indications that your child is not feeling well. This may be caused by anything, a stomach virus, an ear ache or heart disease, so please call Antwon Rodrigez MD. If Antwon Rodrigez MD thinks you need to be checked for your heart, they will let us know.     If your child experiences a rapid or very slow heart rate and has the following symptoms, call Antwon Rodrigez MD or go to the nearest emergency room.   unexplained chest pain   does not look right   feels like they are going to pass out   actually passes out for unexplained reasons   weakness or fatigue   shortness of breath  or breathing fast   consistent poor feeding     If your child experiences a rapid or very slow heart rate that lasts longer than 30 minutes call Antwon Rodrigez MD or go to the nearest emergency room.     If your child feels like they are going to pass out - have them sit down or lay down immediately. Raise the feet above the head (prop the feet on a chair or the wall) until the feeling passes. Slowly allow the child to sit, then stand. If the feeling returns, lay back down and start over.     It is very important that you notify Antwon Rodrigez MD first. Antwon Rodrigez MD or the ER Physician can reach Dr. Johnny Osorio at  the office or through [facility] at [phone] as needed.    Call our office ([phone]) one week after ALL tests for results.     PREVENTION OF BACTERIAL ENDOCARDITIS (selective IE)    A COPY OF THIS SHEET MUST BE GIVEN TO ALL OF YOUR DOCTORS OR HEALTH CARE PROVIDERS    You have received this information because you are at an increased risk for developing adverse outcomes from infective endocarditis (IE), also known as subacute bacterial endocarditis (SBE).    Patient Name:  Tomer Melissa    : 2000   Diagnosis:   1. Tricuspid but functionally bicuspid aortic valve    2. Aortic dilatation    3. Aortic valve stenosis, etiology of cardiac valve disease unspecified    4. Aortic valve insufficiency, etiology of cardiac valve disease unspecified    5. Bradycardia        As of 4/3/2023, Johnny Osorio MD, Pediatric Cardiologist recommends that Tomer receive SELECTIVE USE of antibiotic prophylaxis from bacterial endocarditis.    Antibiotic prophylaxis with dental or surgical procedures is recommended in selected instances if your dentist, surgeon or physician believes there is a greater risk of infection.  For example:  1) Any significantly infected operative field (Example: dental abscess or ruptured appendix) which may increase the bacterial load to the blood stream during the procedure; 2) Benefits of antibiotic coverage should be weighed against risk of allergic reactions and anaphylaxis; therefore, their use should be carefully selected based on individual cases.     Antibiotic prophylaxis is NOT recommended for the following dental procedures or events: routine anesthetic injections through non-infected tissue; taking dental radiographs; placement of removable prosthodontic or orthodontic appliances; adjustment of orthodontic appliances; placement of orthodontic brackets; and shedding of deciduous teeth or bleeding from trauma to the lips or oral mucosa.   If recommended by the  Health Care Provider - Antibiotic Prophylactic Regimens   Regimen - Single Dose 30-60 minutes before Procedure  Situation Agent Adults Children   Oral Amoxicillin 2g 50/mg/kg   Unable to take oral meds Ampicillin   OR  Cefazolin or ceftriaxone 2 g IM or IV1    1 g IM or IV 50 mg/kg IM or IV    50 mg/kg IM or IV   Allergic to Penicillins or ampicillin-Oral regimen Cephalexin 2  OR  Clindamycin  OR  Azithromycin or clarithromycin 2 g    600 mg    500 mg 50 mg/kg    20 mg/kg    15 mg/kg   Allergic to penicillin or ampicillin and unable to take oral medications Cefazolin or ceftriaxone 3  OR  Clindamycin 1 g IM or IV    600 mg IM or IV 50 mg/kg IM or IV    20 mg/kg IM or IV   1IM - intramuscular; IV - intravenous  2Or other first or second generation oral cephalosporin in equivalent adult or pediatric dosage.  3Cephalosporins should not be used in an individual with a history of anaphylaxis, angioedema or urticaria with penicillin or ampicillin.   Adapted from Prevention of Infective Endocarditis: Guidelines From the American Heart Association, by the Committee on Rheumatic Fever, Endocarditis, and Kawasaki Disease. Circulation, e-published April 19, 2007. Go to www.americanheart.org/presenter for more information.    The practice of giving patients antibiotics prior to a dental procedure is no longer recommended EXCEPT for patients with the highest risk of adverse outcomes resulting from bacterial endocarditis. We cannot exclude the possibility that an exceedingly small number of cases, if any, of bacterial endocarditis may be prevented by antibiotic prophylaxis prior to a dental procedure. The importance of good oral and dental health and regular visits to the dentist is important for patients at risk for bacterial endocarditis.  Gastrointestinal (GI)/Genitourinary () Procedures: Antibiotic prophylaxis solely to prevent bacterial endocarditis is no longer recommended for patients who undergo a GI or  tract  procedures, including patients with the highest risk of adverse outcomes due to bacterial endocarditis.    Good dental health and hygiene is very effective in preventing bacterial endocarditis.   Always practice good dental health!

## 2023-04-12 ENCOUNTER — DOCUMENTATION ONLY (OUTPATIENT)
Dept: PEDIATRIC CARDIOLOGY | Facility: CLINIC | Age: 23
End: 2023-04-12
Payer: COMMERCIAL

## 2023-04-12 NOTE — PROGRESS NOTES
Reviewed echo dated 4/3/23 with Dr. Osorio. No significant change. Keep scheduled f/u.     Cm(z-score) 3/12/13 [facility] 5/16/18 [facility] 1/16/2020 [facility] 3/25/21 [facility] echo 1/31/22 [facility] echo 4/3/23 [facility] echo   LVID 4.8 (z+1.1) 4.8  (z-0.23) 5.1 (z+0.61) 4.7      (z -.84) 4.8  (z+0.63) 5.0 (z-0.36)   Ao annulus   2.3 (z+1.3) 2.5 (z+2.6) 2.4 (z+1.9) 2.4  (z+1.2) 2.4 (z+1.3)   Ao root 2.1 (z-0.99) 2.8 (z+0.14) 3.0 (z+0.90) 3.0 (z+0.64) 2.7  (z-.48) 2.8 (z-0.19)   ST junction   3.0 (z+2.5) 3.0 (z+2.7)   2.6  (Z +.48) 2.8 (z+1.2)   Ascending Aorta 2.0 (z+1.1) 3.1 (z+2.2) 3.5 (z+3.5) 3.7 (z+4.1) 3.7  (z+3.6) 4.0 (z+4.5)   AV gradient 12(7) 19(10) 15 CW 14(6) 17(8) 12   AV insufficiency Mild Trivial  Trivial + x 2 jets 1 trivial jet   Trivial +

## 2024-02-26 DIAGNOSIS — I35.1 AORTIC VALVE INSUFFICIENCY, ETIOLOGY OF CARDIAC VALVE DISEASE UNSPECIFIED: ICD-10-CM

## 2024-02-26 DIAGNOSIS — I35.0 AORTIC VALVE STENOSIS, ETIOLOGY OF CARDIAC VALVE DISEASE UNSPECIFIED: ICD-10-CM

## 2024-02-26 DIAGNOSIS — I77.819 AORTIC DILATATION: ICD-10-CM

## 2024-02-26 DIAGNOSIS — R00.1 BRADYCARDIA: ICD-10-CM

## 2024-02-26 DIAGNOSIS — Q23.1 TRICUSPID BUT FUNCTIONALLY BICUSPID AORTIC VALVE: Primary | ICD-10-CM

## 2024-03-05 ENCOUNTER — OFFICE VISIT (OUTPATIENT)
Dept: PEDIATRIC CARDIOLOGY | Facility: CLINIC | Age: 24
End: 2024-03-05
Attending: NURSE PRACTITIONER
Payer: COMMERCIAL

## 2024-03-05 VITALS
RESPIRATION RATE: 18 BRPM | OXYGEN SATURATION: 97 % | HEIGHT: 69 IN | WEIGHT: 174.81 LBS | SYSTOLIC BLOOD PRESSURE: 122 MMHG | DIASTOLIC BLOOD PRESSURE: 78 MMHG | HEART RATE: 64 BPM | BODY MASS INDEX: 25.89 KG/M2

## 2024-03-05 DIAGNOSIS — I35.0 AORTIC VALVE STENOSIS, ETIOLOGY OF CARDIAC VALVE DISEASE UNSPECIFIED: ICD-10-CM

## 2024-03-05 DIAGNOSIS — I77.819 AORTIC DILATATION: ICD-10-CM

## 2024-03-05 DIAGNOSIS — I35.1 AORTIC VALVE INSUFFICIENCY, ETIOLOGY OF CARDIAC VALVE DISEASE UNSPECIFIED: ICD-10-CM

## 2024-03-05 DIAGNOSIS — Q23.1 TRICUSPID BUT FUNCTIONALLY BICUSPID AORTIC VALVE: ICD-10-CM

## 2024-03-05 DIAGNOSIS — R00.1 BRADYCARDIA: ICD-10-CM

## 2024-03-05 PROBLEM — R00.2 PALPITATION: Status: RESOLVED | Noted: 2020-01-16 | Resolved: 2024-03-05

## 2024-03-05 PROBLEM — R07.9 CHEST PAIN: Status: RESOLVED | Noted: 2018-03-28 | Resolved: 2024-03-05

## 2024-03-05 LAB
OHS QRS DURATION: 104 MS
OHS QTC CALCULATION: 387 MS

## 2024-03-05 PROCEDURE — 3008F BODY MASS INDEX DOCD: CPT | Mod: CPTII,S$GLB,, | Performed by: PHYSICIAN ASSISTANT

## 2024-03-05 PROCEDURE — 3074F SYST BP LT 130 MM HG: CPT | Mod: CPTII,S$GLB,, | Performed by: PHYSICIAN ASSISTANT

## 2024-03-05 PROCEDURE — 1159F MED LIST DOCD IN RCRD: CPT | Mod: CPTII,S$GLB,, | Performed by: PHYSICIAN ASSISTANT

## 2024-03-05 PROCEDURE — 1160F RVW MEDS BY RX/DR IN RCRD: CPT | Mod: CPTII,S$GLB,, | Performed by: PHYSICIAN ASSISTANT

## 2024-03-05 PROCEDURE — 99214 OFFICE O/P EST MOD 30 MIN: CPT | Mod: 25,S$GLB,, | Performed by: PHYSICIAN ASSISTANT

## 2024-03-05 PROCEDURE — 3078F DIAST BP <80 MM HG: CPT | Mod: CPTII,S$GLB,, | Performed by: PHYSICIAN ASSISTANT

## 2024-03-05 PROCEDURE — 93000 ELECTROCARDIOGRAM COMPLETE: CPT | Mod: S$GLB,,, | Performed by: PEDIATRICS

## 2024-03-05 NOTE — PROGRESS NOTES
[facility]  Tomer Melissa  2000    Tomer Melissa is a 23 y.o. male presenting for follow-up of   1. Tricuspid but functionally bicuspid aortic valve    2. Aortic dilatation    3. Aortic valve stenosis, etiology of cardiac valve disease unspecified    4. Aortic valve insufficiency, etiology of cardiac valve disease unspecified    5. Bradycardia      Tomer is here today with his mother.    HPI  Tomer Melissa was initially sent for cardiac evaluation in 2013 for blacking out and chest pain; he was subsequently diagnosed with functionally bicuspid aortic valve, minimal stenosis, mild insufficiency, and LV enlargement. He failed to follow-up until March 2018 when he returned for chest pain, which we felt was likely non-cardiac.      Mom states Tomer has been doing well since last visit. Mom states Tomer has a lot of energy and does not get short of breath with activity.  Denies any recent illness, surgeries, or hospitalizations.    There are no reports of chest pain, chest pain with exertion, cyanosis, exercise intolerance, dyspnea, fatigue, feeding intolerance, palpitations, syncope, and tachypnea. No other cardiovascular or medical concerns are reported.      Medications:   Medication List with Changes/Refills   Discontinued Medications    ERYTHROMYCIN (ROMYCIN) OPHTHALMIC OINTMENT    Place a 1/2 inch ribbon of ointment into the left lower eyelid every six hours for five days      Allergies: Review of patient's allergies indicates:  No Known Allergies  Family History   Problem Relation Age of Onset    No Known Problems Mother     Thyroid disease Father     Other Father         lung collapse in 1999    Fainting Father         syncope loop recorder placed Dec 17    No Known Problems Sister     No Known Problems Brother     COPD Maternal Grandmother     Cardiomyopathy Maternal Grandmother     Hypertension Maternal Grandfather     Hypertension Paternal Grandfather     Arrhythmia Neg Hx      "Congenital heart disease Neg Hx     Pacemaker/defibrilator Neg Hx     Long QT syndrome Neg Hx     Heart attacks under age 50 Neg Hx      Past Medical History:   Diagnosis Date    Aortic dilatation     Aortic insufficiency     Aortic stenosis     Bradycardia     Tricuspid but functionally bicuspid aortic valve      Social History     Social History Narrative    Working as an  of packing equipment.       Past Surgical History:   Procedure Laterality Date    NO PAST SURGERIES       Birth History    Birth     Weight: 3.402 kg (7 lb 8 oz)    Gestation Age: 40 wks       There is no immunization history on file for this patient.  Immunizations were reviewed today and if not current, recommend follow up with the PCP for further management.  Past medical history, family history, surgical history, social history updated and reviewed today.     Review of Systems  GENERAL: No fever, chills, fatigability, malaise, or weight loss.  CHEST: Denies GILBERT, cyanosis, wheezing, cough, sputum production, or SOB.  CARDIOVASCULAR: Denies chest pain, palpitations, diaphoresis, SOB, or reduced exercise tolerance.  Endocrine: Denies polyphagia, polydipsia, or polyuria  Skin: Denies rashes or color change  HENT: Negative for congestion, headaches and sore throat.   ABDOMEN: Appetite fine. No weight loss. Denies diarrhea, abdominal pain, nausea, or vomiting.  PERIPHERAL VASCULAR: No edema, varicosities, or cyanosis.  Musculoskeletal: Negative for muscle weakness and stiffness.  NEUROLOGIC: no dizziness, no history of syncope by report, no headache   Psychiatric/Behavioral: Negative for altered mental status. The patient is not nervous/anxious.   Allergic/Immunologic: Negative for environmental allergies.   : dysuria, hematuria, polyuria    Objective:   /78 (BP Location: Right arm, Patient Position: Sitting, BP Method: Medium (Manual))   Pulse 64   Resp 18   Ht 5' 8.9" (1.75 m)   Wt 79.3 kg (174 lb 13.2 oz)   SpO2 97%   BMI " 25.89 kg/m²   Body surface area is 1.96 meters squared.  Growth %ile SmartLinks can only be used for patients less than 20 years old.    Physical Exam  GENERAL: Awake, well-developed well-nourished, no apparent distress  HEENT: mucous membranes moist and pink, normocephalic, no cranial or carotid bruits, sclera anicteric  NECK:  no lymphadenopathy  CHEST: Good air movement, clear to auscultation bilaterally  CARDIOVASCULAR: Quiet precordium, regular rate and rhythm, single S1, split S2, muffled P2, No S3 or S4, no rubs or gallops. No clicks or rumbles. No cardiomegaly by palpation. Grade 1/6  systolic murmur noted at the base of the heart and along the LVOT. Grade 1/6 regurgitant murmur noted at the apex seated.  ABDOMEN: Soft, nontender nondistended, no hepatosplenomegaly, no aortic bruits  EXTREMITIES: Warm well perfused, 2+ radial/pedal/femoral pulses, capillary refill 2 seconds, no clubbing, cyanosis, or edema  NEURO: Alert and oriented, cooperative with exam, face symmetric, moves all extremities well.  Skin: pink, turgor WNL  Vitals reviewed     Tests:   Today's EKG interpretation by Dr. Osorio reveals:   NSR   SA   WNL  (Final report in electronic medical record)      Echocardiogram:   Pertinent echocardiographic findings from the echo dated 4/3/23 are:   Very grainy study*. There are 4 chambers with normally aligned great vessels. Chamber sizes are qualitatively normal. There is good LV function. There are no shunts noted. There is no LVH noted. Physiological TR, PI. AV annulus 2.4 cm (Z Score 1.2) AsAo 4.0 cm (Z Score 4.5) ** Bicommissural AV with fusion of the NCA and RCA cusps Doming AV leaflets. AV PG 12 mmHg Trivial+ AI** MR trivial to mild** LA Volume 25 ml/m2 LV Tissue Doppler Data WNL TAPSE 2.6 cm D. aorta PG 9 mmHg RVIDd 2.7 cm* RVSP 24 mmHg Clinical Correlation Suggested Follow Up Warranted Selective IE Recommended  (Full report in electronic medical record)    Cm(z-score) 3/12/13 [facility] echo 5/16/18  [facility] echo 1/16/2020 [facility] echo 3/25/21 [facility] 1/31/22 [facility] echo 4/3/23 [facility] echo   LVID 4.8 (z+1.1) 4.8  (z-0.23) 5.1 (z+0.61) 4.7      (z -.84) 4.8  (z+0.63) 5.0 (z-0.36)   Ao annulus   2.3 (z+1.3) 2.5 (z+2.6) 2.4 (z+1.9) 2.4  (z+1.2) 2.4 (z+1.3)   Ao root 2.1 (z-0.99) 2.8 (z+0.14) 3.0 (z+0.90) 3.0 (z+0.64) 2.7  (z-.48) 2.8 (z-0.19)   ST junction   3.0 (z+2.5) 3.0 (z+2.7)   2.6  (Z +.48) 2.8 (z+1.2)   Ascending Aorta 2.0 (z+1.1) 3.1 (z+2.2) 3.5 (z+3.5) 3.7 (z+4.1) 3.7  (z+3.6) 4.0 (z+4.5)   AV gradient 12(7) 19(10) 15 CW 14(6) 17(8) 12   AV insufficiency Mild Trivial  Trivial + x 2 jets 1 trivial jet   Trivial +       Assessment:  Patient Active Problem List   Diagnosis    Tricuspid but functionally bicuspid aortic valve    Aortic valve regurgitation    Aortic valve stenosis    Aortic dilatation       Discussion/ Plan:   Dr. Osorio reviewed history and physical exam. He then performed the physical exam. He discussed the findings with the patient's caregiver(s), and answered all questions. Dr. Osorio and I have reviewed our general guidelines related to cardiac issues with the family.  I instructed them in the event of an emergency to call 911 or go to the nearest emergency room.  They know to contact the PCP if problems arise or if they are in doubt.    We have discussed Tomer Melissa 's aortic valve anatomy, which is functionally bicuspid with mild AS, trivial AI, and ascending aorta dilatation. Preliminary report of echo today showed :ascending aorta 3.6 cm (Z score +3.0), Aortic valve PG 17 (9) mmHg, Trivial AI, Trivial MR . Final report pending. He will call for final results.  Dr. Osorio reviewed Losartan has been shown to work  in slowing down aortic dilatation in true bicuspid valves. His is functionally bicuspid so it has not been proven to work but Dr. Osorio has talked to his colleagues who have used it in tricuspid AV valves with aortic dilatation and it has helped slowed down dilatation.  Tomer will think  about if he wants to start low dose Losartan to see if it helps slow down aortic dilatation.  We would start 25 mg daily of Losartan and then raise it to 25 mg BID.  In this situation, we monitor specifically for aortic stenosis (narrowing), aortic insufficiency (leaking), and aortic root ectasia (dilation). Statistically, these changes will occur over time, particularly during puberty. Tomer Melissa should be seen at least yearly and have serial echocardiograms to monitor for changes. For now, Tomer Melissa can be handled normally but may require activity restrictions in the future pending the valve changes.  Will plan to transition to Dr. Hussein after visit next year.     No activity restrictions are indicated at this time. Activities may include endurance training, interscholastic athletic competition and contact sports. However, I would recommend avoiding heavy weight lifting; anything that can be moved 10-20 times without straining would be appropriate.     Selective endocarditis prophylaxis is recommended in this circumstance.      Medications:   Medication List with Changes/Refills   Discontinued Medications    ERYTHROMYCIN (ROMYCIN) OPHTHALMIC OINTMENT    Place a 1/2 inch ribbon of ointment into the left lower eyelid every six hours for five days         Orders placed this encounter  Orders Placed This Encounter   Procedures    EKG 12-lead    Pediatric Echo Limited Echo? No       Follow-Up:   Return to clinic in 1 year with echo and EKG  or sooner if there are any concerns    Sincerely,  Johnny Osorio MD    Note Contributing Authors:  MD Liset Olea PA-C  03/05/2024    Attestation: Johnny Osorio MD  I have reviewed the records and agree with the above. I have examined the patient and discussed the findings with the family in attendance. All questions were answered to their satisfaction. I agree with the plan and the follow up instructions.

## 2024-03-05 NOTE — PATIENT INSTRUCTIONS
Johnny Osorio MD  Pediatric Cardiology  [address] [place] [address]  [phone]    General Guidelines    Name: Tomer Melissa                   : 2000    Diagnosis:   1. Tricuspid but functionally bicuspid aortic valve    2. Aortic dilatation    3. Aortic valve stenosis, etiology of cardiac valve disease unspecified    4. Aortic valve insufficiency, etiology of cardiac valve disease unspecified    5. Bradycardia        PCP: Antwon Rodrigez MD  PCP Phone Number: [phone]    If you have an emergency or you think you have an emergency, go to the nearest emergency room!     Breathing too fast, doesnt look right, consistently not eating well, your child needs to be checked. These are general indications that your child is not feeling well. This may be caused by anything, a stomach virus, an ear ache or heart disease, so please call Antwon Rodrigez MD. If Antwon Rodrigez MD thinks you need to be checked for your heart, they will let us know.     If your child experiences a rapid or very slow heart rate and has the following symptoms, call Antwon Rodrigez MD or go to the nearest emergency room.   unexplained chest pain   does not look right   feels like they are going to pass out   actually passes out for unexplained reasons   weakness or fatigue   shortness of breath  or breathing fast   consistent poor feeding     If your child experiences a rapid or very slow heart rate that lasts longer than 30 minutes call Antwon Rodrigez MD or go to the nearest emergency room.     If your child feels like they are going to pass out - have them sit down or lay down immediately. Raise the feet above the head (prop the feet on a chair or the wall) until the feeling passes. Slowly allow the child to sit, then stand. If the feeling returns, lay back down and start over.     It is very important that you notify Antwon Rodrigez MD first. Antwon Rodrigez MD or the ER Physician can reach   Johnny Osorio at the office or through [facility] at [phone] as needed.    Call our office ([phone]) one week after ALL tests for results.     PREVENTION OF BACTERIAL ENDOCARDITIS (selective IE)    A COPY OF THIS SHEET MUST BE GIVEN TO ALL OF YOUR DOCTORS OR HEALTH CARE PROVIDERS    You have received this information because you are at an increased risk for developing adverse outcomes from infective endocarditis (IE), also known as subacute bacterial endocarditis (SBE).    Patient Name:  Tomer Melissa    : 2000   Diagnosis:   1. Tricuspid but functionally bicuspid aortic valve    2. Aortic dilatation    3. Aortic valve stenosis, etiology of cardiac valve disease unspecified    4. Aortic valve insufficiency, etiology of cardiac valve disease unspecified    5. Bradycardia        As of 3/5/2024, Johnny Osorio MD, Pediatric Cardiologist recommends that Tomer receive SELECTIVE USE of antibiotic prophylaxis from bacterial endocarditis.    Antibiotic prophylaxis with dental or surgical procedures is recommended in selected instances if your dentist, surgeon or physician believes there is a greater risk of infection.  For example:  1) Any significantly infected operative field (Example: dental abscess or ruptured appendix) which may increase the bacterial load to the blood stream during the procedure; 2) Benefits of antibiotic coverage should be weighed against risk of allergic reactions and anaphylaxis; therefore, their use should be carefully selected based on individual cases.     Antibiotic prophylaxis is NOT recommended for the following dental procedures or events: routine anesthetic injections through non-infected tissue; taking dental radiographs; placement of removable prosthodontic or orthodontic appliances; adjustment of orthodontic appliances; placement of orthodontic brackets; and shedding of deciduous teeth or bleeding from trauma to the lips or oral mucosa.   If  recommended by the Health Care Provider - Antibiotic Prophylactic Regimens   Regimen - Single Dose 30-60 minutes before Procedure  Situation Agent Adults Children   Oral Amoxicillin 2g 50/mg/kg   Unable to take oral meds Ampicillin   OR  Cefazolin or ceftriaxone 2 g IM or IV1    1 g IM or IV 50 mg/kg IM or IV    50 mg/kg IM or IV   Allergic to Penicillins or ampicillin-Oral regimen Cephalexin 2  OR  Clindamycin  OR  Azithromycin or clarithromycin 2 g    600 mg    500 mg 50 mg/kg    20 mg/kg    15 mg/kg   Allergic to penicillin or ampicillin and unable to take oral medications Cefazolin or ceftriaxone 3  OR  Clindamycin 1 g IM or IV    600 mg IM or IV 50 mg/kg IM or IV    20 mg/kg IM or IV   1IM - intramuscular; IV - intravenous  2Or other first or second generation oral cephalosporin in equivalent adult or pediatric dosage.  3Cephalosporins should not be used in an individual with a history of anaphylaxis, angioedema or urticaria with penicillin or ampicillin.   Adapted from Prevention of Infective Endocarditis: Guidelines From the American Heart Association, by the Committee on Rheumatic Fever, Endocarditis, and Kawasaki Disease. Circulation, e-published April 19, 2007. Go to www.americanheart.org/presenter for more information.    The practice of giving patients antibiotics prior to a dental procedure is no longer recommended EXCEPT for patients with the highest risk of adverse outcomes resulting from bacterial endocarditis. We cannot exclude the possibility that an exceedingly small number of cases, if any, of bacterial endocarditis may be prevented by antibiotic prophylaxis prior to a dental procedure. The importance of good oral and dental health and regular visits to the dentist is important for patients at risk for bacterial endocarditis.  Gastrointestinal (GI)/Genitourinary () Procedures: Antibiotic prophylaxis solely to prevent bacterial endocarditis is no longer recommended for patients who undergo a GI  or  tract procedures, including patients with the highest risk of adverse outcomes due to bacterial endocarditis.    Good dental health and hygiene is very effective in preventing bacterial endocarditis.   Always practice good dental health!

## 2024-03-12 ENCOUNTER — DOCUMENTATION ONLY (OUTPATIENT)
Dept: PEDIATRIC CARDIOLOGY | Facility: CLINIC | Age: 24
End: 2024-03-12
Payer: COMMERCIAL

## 2024-03-12 NOTE — PROGRESS NOTES
Cm(z-score) 3/12/13 [facility] echo 5/16/18 [facility] echo 1/16/2020 [facility] echo 3/25/21 [facility] echo 1/31/22 [facility] 4/3/23 [facility] echo 3/5/24  [facility]  echo   LVID 4.8 (z+1.1) 4.8  (z-0.23) 5.1 (z+0.61) 4.7      (z -.84) 4.8  (z+0.63) 5.0 (z-0.36) 5.2 cm Z-0.10   Ao annulus   2.3 (z+1.3) 2.5 (z+2.6) 2.4 (z+1.9) 2.4  (z+1.2) 2.4 (z+1.3) 2.4 cm Z 1.2    Ao root 2.1 (z-0.99) 2.8 (z+0.14) 3.0 (z+0.90) 3.0 (z+0.64) 2.7  (z-.48) 2.8 (z-0.19) 3.1 cm Z 0.38   ST junction   3.0 (z+2.5) 3.0 (z+2.7)   2.6  (Z +.48) 2.8 (z+1.2)    Ascending Aorta 2.0 (z+1.1) 3.1 (z+2.2) 3.5 (z+3.5) 3.7 (z+4.1) 3.7  (z+3.6) 4.0 (z+4.5) 3.6 cm Z 3.0   AV gradient 12(7) 19(10) 15 CW 14(6) 17(8) 12 17 (9)   AV insufficiency Mild Trivial  Trivial + x 2 jets 1 trivial jet   Trivial + trivial       Echo 3/5/24  There are 4 chambers with normally aligned great vessels. Chamber sizes are qualitatively normal. There is good LV function. There are no shunts noted. There is no LVH noted. Physiological TR, PI. The right coronary artery and left coronary are patent by 2D. Asc Ao 3.6 cm (Z Score +3.0) Bicommissural AV with fusion of the NCA and RCA cusps previously noted. Morphology not well visualized on today's study. Doming AV leaflets. Trivial AI AV PG 17 (9) mmHg Desc Ao PG 5 mmHg Trivial MR LA Volume 21 ml/m2 RVSP 19 mmHg LV lateral tissue doppler data WNL TAPSE 2.9 cm Selective IE Clinical Correlation Suggested     No significant change. Continue with current plan.

## 2025-03-18 DIAGNOSIS — I77.819 AORTIC DILATATION: ICD-10-CM

## 2025-03-18 DIAGNOSIS — I35.1 AORTIC VALVE INSUFFICIENCY, ETIOLOGY OF CARDIAC VALVE DISEASE UNSPECIFIED: ICD-10-CM

## 2025-03-18 DIAGNOSIS — I35.0 AORTIC VALVE STENOSIS, ETIOLOGY OF CARDIAC VALVE DISEASE UNSPECIFIED: ICD-10-CM

## 2025-03-18 DIAGNOSIS — Q23.81 TRICUSPID BUT FUNCTIONALLY BICUSPID AORTIC VALVE: Primary | ICD-10-CM

## 2025-03-31 DIAGNOSIS — Q23.81 TRICUSPID BUT FUNCTIONALLY BICUSPID AORTIC VALVE: Primary | ICD-10-CM

## 2025-03-31 DIAGNOSIS — R00.1 BRADYCARDIA: ICD-10-CM

## 2025-03-31 DIAGNOSIS — I77.819 AORTIC DILATATION: ICD-10-CM

## 2025-04-03 ENCOUNTER — CLINICAL SUPPORT (OUTPATIENT)
Dept: PEDIATRIC CARDIOLOGY | Facility: CLINIC | Age: 25
End: 2025-04-03
Payer: COMMERCIAL

## 2025-04-03 ENCOUNTER — OFFICE VISIT (OUTPATIENT)
Dept: PEDIATRIC CARDIOLOGY | Facility: CLINIC | Age: 25
End: 2025-04-03
Payer: COMMERCIAL

## 2025-04-03 VITALS
DIASTOLIC BLOOD PRESSURE: 72 MMHG | SYSTOLIC BLOOD PRESSURE: 118 MMHG | RESPIRATION RATE: 18 BRPM | HEART RATE: 62 BPM | HEIGHT: 69 IN | OXYGEN SATURATION: 97 % | WEIGHT: 178.88 LBS | BODY MASS INDEX: 26.49 KG/M2

## 2025-04-03 DIAGNOSIS — I77.819 AORTIC DILATATION: ICD-10-CM

## 2025-04-03 DIAGNOSIS — I35.0 AORTIC VALVE STENOSIS, ETIOLOGY OF CARDIAC VALVE DISEASE UNSPECIFIED: ICD-10-CM

## 2025-04-03 DIAGNOSIS — Q23.81 TRICUSPID BUT FUNCTIONALLY BICUSPID AORTIC VALVE: ICD-10-CM

## 2025-04-03 DIAGNOSIS — I35.1 AORTIC VALVE INSUFFICIENCY, ETIOLOGY OF CARDIAC VALVE DISEASE UNSPECIFIED: ICD-10-CM

## 2025-04-03 DIAGNOSIS — R00.1 BRADYCARDIA: ICD-10-CM

## 2025-04-03 NOTE — PROGRESS NOTES
[facility]  Tomer Melissa  2000    Tomer Melissa is a 24 y.o. male presenting for follow-up of    Tricuspid but functionally bicuspid aortic valve    Aortic valve regurgitation    Aortic valve stenosis    Aortic dilatation    Tomer is here today with his soon to be bride. He is getting  May 17th.     [facility]  Tomer Melissa was initially sent for cardiac evaluation in 2013 for blacking out and chest pain; he was subsequently diagnosed with functionally bicuspid aortic valve, minimal stenosis, mild insufficiency, and LV enlargement. He failed to follow-up until March 2018 when he returned for chest pain, which we felt was likely non-cardiac.      He was last seen 3/5/24. Exam revealed  Grade 1/6  systolic murmur noted at the base of the heart and along the LVOT. Grade 1/6 regurgitant murmur noted at the apex seated. EKG WNL.  Echo showed no significant change.      Tomer has been doing well since last visit.  Tomer has a lot of energy and does not get short of breath with activity. Denies any recent illness, surgeries, or hospitalizations.    There are no reports of chest pain, chest pain with exertion, cyanosis, exercise intolerance, dyspnea, fatigue, palpitations, syncope, and tachypnea. No other cardiovascular or medical concerns are reported.      Medications:    Allergies: Review of patient's allergies indicates:  No Known Allergies  Family History   Problem Relation Name Age of Onset    No Known Problems Mother      Thyroid disease Father      Other Father          lung collapse in 1999    Fainting Father          syncope loop recorder placed Dec 17    No Known Problems Sister      No Known Problems Brother      COPD Maternal Grandmother      Cardiomyopathy Maternal Grandmother      Hypertension Maternal Grandfather      Hypertension Paternal Grandfather      Arrhythmia Neg Hx      Congenital heart disease Neg Hx      Pacemaker/defibrilator Neg Hx      Long QT syndrome Neg Hx       "Heart attacks under age 50 Neg Hx       Past Medical History:   Diagnosis Date    Aortic dilatation     Aortic insufficiency     Aortic stenosis     Tricuspid but functionally bicuspid aortic valve      Social History     Social History Narrative    Working as an  of packing equipment.       Past Surgical History:   Procedure Laterality Date    NO PAST SURGERIES       Birth History    Birth     Weight: 3.402 kg (7 lb 8 oz)    Gestation Age: 40 wks       There is no immunization history on file for this patient.  Immunizations were reviewed today and if not current, recommend follow up with the PCP for further management.  Past medical history, family history, surgical history, social history updated and reviewed today.     Review of Systems  GENERAL: No fever, chills, fatigability, malaise, or weight loss.  CHEST: Denies GILBERT, cyanosis, wheezing, cough, sputum production, or SOB.  CARDIOVASCULAR: Denies chest pain, palpitations, diaphoresis, SOB, or reduced exercise tolerance.  Endocrine: Denies polyphagia, polydipsia, or polyuria  Skin: Denies rashes or color change  HENT: Negative for congestion, headaches and sore throat.   ABDOMEN: Appetite fine. No weight loss. Denies diarrhea, abdominal pain, nausea, or vomiting.  PERIPHERAL VASCULAR: No edema, varicosities, or cyanosis.  Musculoskeletal: Negative for muscle weakness and stiffness.  NEUROLOGIC: no dizziness, no history of syncope by report, no headache   Psychiatric/Behavioral: Negative for altered mental status. The patient is not nervous/anxious.   Allergic/Immunologic: Negative for environmental allergies.   : dysuria, hematuria, polyuria    Objective:   /72 (BP Location: Right arm, Patient Position: Sitting)   Pulse 62   Resp 18   Ht 5' 8.9" (1.75 m)   Wt 81.1 kg (178 lb 14.5 oz)   SpO2 97%   BMI 26.50 kg/m²   Body surface area is 1.99 meters squared.  Growth %ile SmartLinks can only be used for patients less than 20 years " old.    Physical Exam  GENERAL: Awake, well-developed well-nourished, no apparent distress  HEENT: mucous membranes moist and pink, normocephalic, no cranial or carotid bruits, sclera anicteric  NECK:  no lymphadenopathy  CHEST: Good air movement, clear to auscultation bilaterally  CARDIOVASCULAR: Quiet precordium, regular rate and rhythm, single S1, split S2, normal P2, No S3 or S4, no rubs or gallops. No clicks or rumbles. No cardiomegaly by palpation. Grade 1/6  systolic murmur noted at the base of the heart   ABDOMEN: Soft, nontender nondistended, no hepatosplenomegaly, no aortic bruits  EXTREMITIES: Warm well perfused, 2+ radial/pedal/femoral pulses, capillary refill 2 seconds, no clubbing, cyanosis, or edema  NEURO: Alert and oriented, cooperative with exam, face symmetric, moves all extremities well.  Skin: pink, turgor WNL  Vitals reviewed     Tests:   Today's EKG interpretation by Dr. Osorio reveals:   NSR With SA  WNL  (Final report in electronic medical record)    Cm(z-score) 3/12/13 [facility] echo 5/16/18 [facility] echo 1/16/2020 [facility] echo 3/25/21 [facility] echo 1/31/22 [facility] echo 4/3/23 [facility] echo 3/5/24  [facility]  echo   LVID 4.8 (z+1.1) 4.8  (z-0.23) 5.1 (z+0.61) 4.7      (z -.84) 4.8  (z+0.63) 5.0 (z-0.36) 5.2 cm Z-0.10   Ao annulus   2.3 (z+1.3) 2.5 (z+2.6) 2.4 (z+1.9) 2.4  (z+1.2) 2.4 (z+1.3) 2.4 cm Z 1.2    Ao root 2.1 (z-0.99) 2.8 (z+0.14) 3.0 (z+0.90) 3.0 (z+0.64) 2.7  (z-.48) 2.8 (z-0.19) 3.1 cm Z 0.38   ST junction   3.0 (z+2.5) 3.0 (z+2.7)   2.6  (Z +.48) 2.8 (z+1.2)     Ascending Aorta 2.0 (z+1.1) 3.1 (z+2.2) 3.5 (z+3.5) 3.7 (z+4.1) 3.7  (z+3.6) 4.0 (z+4.5) 3.6 cm Z 3.0   AV gradient 12(7) 19(10) 15 CW 14(6) 17(8) 12 17 (9)   AV insufficiency Mild Trivial  Trivial + x 2 jets 1 trivial jet   Trivial + trivial         Echo 3/5/24  There are 4 chambers with normally aligned great vessels. Chamber sizes are qualitatively normal. There is good LV function. There are no shunts noted. There is no LVH noted. Physiological  TR, PI. The right coronary artery and left coronary are patent by 2D. Asc Ao 3.6 cm (Z Score +3.0) Bicommissural AV with fusion of the NCA and RCA cusps previously noted. Morphology not well visualized on today's study. Doming AV leaflets. Trivial AI AV PG 17 (9) mmHg Desc Ao PG 5 mmHg Trivial MR LA Volume 21 ml/m2 RVSP 19 mmHg LV lateral tissue doppler data WNL TAPSE 2.9 cm Selective IE Clinical Correlation Suggested     Assessment:  Patient Active Problem List   Diagnosis    Tricuspid but functionally bicuspid aortic valve    Aortic valve regurgitation    Aortic valve stenosis    Aortic dilatation       Discussion/ Plan:   Dr. Osorio reviewed history and physical exam. He then performed the physical exam. He discussed the findings with the patient's caregiver(s), and answered all questions. Dr. Osorio and I have reviewed our general guidelines related to cardiac issues with the family.  I instructed them in the event of an emergency to call 911 or go to the nearest emergency room.  They know to contact the PCP if problems arise or if they are in doubt.    We have discussed Tomer Melissa 's aortic valve anatomy, which is functionally? bicuspid with mild AS, trivial AI, and ascending aorta dilatation. Preliminary report of echo today suggested it may be true bicuspid. Dr. Osorio will look at the images. If it is a true bicuspid AV, will consider Losartan or Tenormin to help slow down aortic dilation.  In this situation, we monitor specifically for aortic stenosis (narrowing), aortic insufficiency (leaking), and aortic root ectasia (dilation). Statistically, these changes will occur over time, particularly during puberty. Tomer Melissa should be seen at least yearly and have serial echocardiograms to monitor for changes. For now, Tomer Melissa can be handled normally but may require activity restrictions in the future pending the valve changes.   Will consider transition to Dr. Hussein at next visit.     No activity  restrictions are indicated at this time. Activities may include endurance training, interscholastic athletic competition and contact sports. However, I would recommend avoiding heavy weight lifting; anything that can be moved 10-20 times without straining would be appropriate.    I spent a total of 20 minutes on the day of the visit.  This includes face to face time and non-face to face time preparing to see the patient (eg, review of tests), obtaining and/or reviewing separately obtained history, documenting clinical information in the electronic or other health record, independently interpreting results and communicating results to the patient/family/caregiver, or care coordinator.       Selective endocarditis prophylaxis is recommended in this circumstance.      Medications:       Orders placed this encounter  Orders Placed This Encounter   Procedures    EKG 12-lead    Pediatric Echo Limited Echo? No       Follow-Up:   Return to clinic in 1 year with EKG and echo or sooner if there are any concerns    Sincerely,  Johnny Osorio MD    Note Contributing Authors:  MD Liset Olea PA-C  04/03/2025    Attestation: Johnny Osorio MD  I have reviewed the records and agree with the above. I have examined the patient and discussed the findings with the family in attendance. All questions were answered to their satisfaction. I agree with the plan and the follow up instructions.

## 2025-04-04 LAB
OHS QRS DURATION: 104 MS
OHS QTC CALCULATION: 385 MS

## 2025-04-07 ENCOUNTER — TELEPHONE (OUTPATIENT)
Dept: PEDIATRIC CARDIOLOGY | Facility: CLINIC | Age: 25
End: 2025-04-07
Payer: COMMERCIAL

## 2025-04-07 ENCOUNTER — RESULTS FOLLOW-UP (OUTPATIENT)
Dept: PEDIATRIC CARDIOLOGY | Facility: CLINIC | Age: 25
End: 2025-04-07

## 2025-04-07 DIAGNOSIS — I77.819 AORTIC DILATATION: ICD-10-CM

## 2025-04-07 DIAGNOSIS — I35.1 AORTIC VALVE INSUFFICIENCY, ETIOLOGY OF CARDIAC VALVE DISEASE UNSPECIFIED: ICD-10-CM

## 2025-04-07 DIAGNOSIS — Q23.81 BICUSPID AORTIC VALVE: Primary | ICD-10-CM

## 2025-04-07 RX ORDER — ATENOLOL 25 MG/1
12.5 TABLET ORAL 2 TIMES DAILY
Qty: 90 TABLET | Refills: 3 | Status: SHIPPED | OUTPATIENT
Start: 2025-04-07 | End: 2026-04-07

## 2025-04-07 NOTE — TELEPHONE ENCOUNTER
Echo 04/03/2025 reviewed with Dr. Osorio. His AV is a true bicuspid with ascending aorta dilatation. Dr. Osorio would like to start Tenormin 12.5 mg BID. Spoke to Tomer. Reviewed results and plan. Discussed possible adverse reaction. He denies a current history of asthma but will stop medication if he has new symptoms. He will alert us with any concerns.    Current Outpatient Medications   Medication Sig    atenoloL (TENORMIN) 25 MG tablet Take 0.5 tablets (12.5 mg total) by mouth 2 (two) times a day.     No current facility-administered medications for this visit.         There are 4 chambers with normally aligned great vessels.  Chamber sizes are qualitatively normal.  There is good LV function.  There are no shunts noted.  There is no LVH noted.  Physiological TR, PI.  The right coronary artery and left coronary are patent by 2D.  Asc Ao 3.7 cm, Z = +3.2  Bicuspid aortic valve? Type 1 Celio classification  Doming AV leaflets.  Trivial AI  AV PG 12 mmHg  Desc Ao PG 7 mmHg  Trivial MR  LA Volume 18 ml/m2  RVSP 20 mmHg  LV lateral tissue doppler data WNL  TAPSE 2.1 cm  Selective IE  Clinical Correlation Suggested  Consider Beta signaling blocckade    ----- Message from Liset Rubio PA-C sent at 4/4/2025 11:30 AM CDT -----    ----- Message -----  From: Interface, Incoming Cupid Results  Sent: 4/4/2025  11:10 AM CDT  To: Johnny Osorio MD